# Patient Record
Sex: MALE | Race: BLACK OR AFRICAN AMERICAN | NOT HISPANIC OR LATINO | Employment: OTHER | URBAN - METROPOLITAN AREA
[De-identification: names, ages, dates, MRNs, and addresses within clinical notes are randomized per-mention and may not be internally consistent; named-entity substitution may affect disease eponyms.]

---

## 2017-07-06 ENCOUNTER — HOSPITAL ENCOUNTER (OUTPATIENT)
Dept: RADIOLOGY | Facility: HOSPITAL | Age: 72
Discharge: HOME | End: 2017-07-06
Attending: FAMILY MEDICINE
Payer: MEDICARE

## 2017-07-06 DIAGNOSIS — Z77.098 EXPOSURE TO TOXIC CHEMICAL: Primary | ICD-10-CM

## 2017-07-06 DIAGNOSIS — R63.4 LOSS OF WEIGHT: ICD-10-CM

## 2017-07-06 DIAGNOSIS — R53.83 FATIGUE: Primary | ICD-10-CM

## 2017-07-06 DIAGNOSIS — Z00.00 ROUTINE GENERAL MEDICAL EXAMINATION AT A HEALTH CARE FACILITY: ICD-10-CM

## 2017-07-06 DIAGNOSIS — Z77.098 EXPOSURE TO TOXIC CHEMICAL: ICD-10-CM

## 2017-07-06 PROCEDURE — 71020 XR CHEST PA AND LATERAL: CPT | Mod: 26,,, | Performed by: RADIOLOGY

## 2017-09-20 ENCOUNTER — TELEPHONE (OUTPATIENT)
Dept: RADIOLOGY | Facility: HOSPITAL | Age: 72
End: 2017-09-20

## 2017-09-21 ENCOUNTER — HOSPITAL ENCOUNTER (OUTPATIENT)
Dept: RADIOLOGY | Facility: HOSPITAL | Age: 72
Discharge: HOME | End: 2017-09-21
Attending: PSYCHIATRY & NEUROLOGY
Payer: MEDICARE

## 2017-09-21 DIAGNOSIS — R41.3 MEMORY LOSS: ICD-10-CM

## 2017-09-21 DIAGNOSIS — G20.C PARKINSONISM: ICD-10-CM

## 2017-09-21 DIAGNOSIS — R26.9 GAIT ABNORMALITY: ICD-10-CM

## 2017-09-21 DIAGNOSIS — R25.1 TREMOR: ICD-10-CM

## 2017-09-21 DIAGNOSIS — R25.1 TREMOR: Primary | ICD-10-CM

## 2017-09-21 PROCEDURE — 70551 MRI BRAIN STEM W/O DYE: CPT | Mod: 26,,, | Performed by: RADIOLOGY

## 2017-09-21 PROCEDURE — 70551 MRI BRAIN STEM W/O DYE: CPT | Mod: TC,PO

## 2022-03-16 ENCOUNTER — HOSPITAL ENCOUNTER (OUTPATIENT)
Facility: HOSPITAL | Age: 77
Discharge: HOSPICE | End: 2022-03-18
Attending: EMERGENCY MEDICINE | Admitting: INTERNAL MEDICINE
Payer: MEDICARE

## 2022-03-16 ENCOUNTER — DOCUMENTATION ONLY (OUTPATIENT)
Dept: GASTROENTEROLOGY | Facility: HOSPITAL | Age: 77
End: 2022-03-16
Payer: MEDICARE

## 2022-03-16 DIAGNOSIS — F03.90 DEMENTIA WITHOUT BEHAVIORAL DISTURBANCE, UNSPECIFIED DEMENTIA TYPE: ICD-10-CM

## 2022-03-16 DIAGNOSIS — D64.9 ANEMIA, UNSPECIFIED TYPE: Primary | ICD-10-CM

## 2022-03-16 DIAGNOSIS — A41.9 SEVERE SEPSIS: ICD-10-CM

## 2022-03-16 DIAGNOSIS — R53.1 WEAKNESS: ICD-10-CM

## 2022-03-16 DIAGNOSIS — A41.9 SEPSIS: ICD-10-CM

## 2022-03-16 DIAGNOSIS — R93.3 ABNORMAL CT SCAN, SIGMOID COLON: ICD-10-CM

## 2022-03-16 DIAGNOSIS — K40.90 RIGHT INGUINAL HERNIA: ICD-10-CM

## 2022-03-16 DIAGNOSIS — R65.20 SEVERE SEPSIS: ICD-10-CM

## 2022-03-16 DIAGNOSIS — K56.41 FECAL IMPACTION IN RECTUM: ICD-10-CM

## 2022-03-16 DIAGNOSIS — L89.90 PRESSURE INJURY OF SKIN, UNSPECIFIED INJURY STAGE, UNSPECIFIED LOCATION: ICD-10-CM

## 2022-03-16 DIAGNOSIS — R33.9 URINARY RETENTION: ICD-10-CM

## 2022-03-16 DIAGNOSIS — I10 HTN (HYPERTENSION): ICD-10-CM

## 2022-03-16 DIAGNOSIS — I95.9 HYPOTENSION, UNSPECIFIED HYPOTENSION TYPE: ICD-10-CM

## 2022-03-16 PROBLEM — L89.159 SACRAL DECUBITUS ULCER: Status: ACTIVE | Noted: 2022-03-16

## 2022-03-16 PROBLEM — L89.40: Status: ACTIVE | Noted: 2022-03-16

## 2022-03-16 LAB
ALBUMIN SERPL BCP-MCNC: 1.1 G/DL (ref 3.5–5.2)
ALP SERPL-CCNC: 81 U/L (ref 55–135)
ALT SERPL W/O P-5'-P-CCNC: 9 U/L (ref 10–44)
ANION GAP SERPL CALC-SCNC: 5 MMOL/L (ref 8–16)
APTT BLDCRRT: 30.6 SEC (ref 21–32)
AST SERPL-CCNC: 13 U/L (ref 10–40)
BASOPHILS # BLD AUTO: 0.04 K/UL (ref 0–0.2)
BASOPHILS NFR BLD: 0.4 % (ref 0–1.9)
BILIRUB SERPL-MCNC: 0.4 MG/DL (ref 0.1–1)
BILIRUB UR QL STRIP: NEGATIVE
BUN SERPL-MCNC: 12 MG/DL (ref 8–23)
CALCIUM SERPL-MCNC: 7.2 MG/DL (ref 8.7–10.5)
CHLORIDE SERPL-SCNC: 103 MMOL/L (ref 95–110)
CLARITY UR REFRACT.AUTO: CLEAR
CO2 SERPL-SCNC: 27 MMOL/L (ref 23–29)
COLOR UR AUTO: YELLOW
CREAT SERPL-MCNC: 0.5 MG/DL (ref 0.5–1.4)
CTP QC/QA: YES
DIFFERENTIAL METHOD: ABNORMAL
EOSINOPHIL # BLD AUTO: 0.1 K/UL (ref 0–0.5)
EOSINOPHIL NFR BLD: 0.9 % (ref 0–8)
ERYTHROCYTE [DISTWIDTH] IN BLOOD BY AUTOMATED COUNT: 17.6 % (ref 11.5–14.5)
EST. GFR  (AFRICAN AMERICAN): >60 ML/MIN/1.73 M^2
EST. GFR  (NON AFRICAN AMERICAN): >60 ML/MIN/1.73 M^2
GLUCOSE SERPL-MCNC: 106 MG/DL (ref 70–110)
GLUCOSE UR QL STRIP: NEGATIVE
HCT VFR BLD AUTO: 23.3 % (ref 40–54)
HGB BLD-MCNC: 7.2 G/DL (ref 14–18)
HGB UR QL STRIP: ABNORMAL
IMM GRANULOCYTES # BLD AUTO: 0.09 K/UL (ref 0–0.04)
IMM GRANULOCYTES NFR BLD AUTO: 0.8 % (ref 0–0.5)
INR PPP: 1.2 (ref 0.8–1.2)
KETONES UR QL STRIP: NEGATIVE
LACTATE SERPL-SCNC: 1.1 MMOL/L (ref 0.5–2.2)
LACTATE SERPL-SCNC: 1.8 MMOL/L (ref 0.5–2.2)
LEUKOCYTE ESTERASE UR QL STRIP: NEGATIVE
LYMPHOCYTES # BLD AUTO: 1 K/UL (ref 1–4.8)
LYMPHOCYTES NFR BLD: 9.8 % (ref 18–48)
MAGNESIUM SERPL-MCNC: 1.7 MG/DL (ref 1.6–2.6)
MCH RBC QN AUTO: 26.5 PG (ref 27–31)
MCHC RBC AUTO-ENTMCNC: 30.9 G/DL (ref 32–36)
MCV RBC AUTO: 86 FL (ref 82–98)
MICROSCOPIC COMMENT: ABNORMAL
MONOCYTES # BLD AUTO: 0.6 K/UL (ref 0.3–1)
MONOCYTES NFR BLD: 5.6 % (ref 4–15)
NEUTROPHILS # BLD AUTO: 8.8 K/UL (ref 1.8–7.7)
NEUTROPHILS NFR BLD: 82.5 % (ref 38–73)
NITRITE UR QL STRIP: NEGATIVE
NRBC BLD-RTO: 0 /100 WBC
PH UR STRIP: 8 [PH] (ref 5–8)
PLATELET # BLD AUTO: 222 K/UL (ref 150–450)
PMV BLD AUTO: 9.4 FL (ref 9.2–12.9)
POTASSIUM SERPL-SCNC: 4 MMOL/L (ref 3.5–5.1)
PROCALCITONIN SERPL IA-MCNC: 0.07 NG/ML
PROT SERPL-MCNC: 5.3 G/DL (ref 6–8.4)
PROT UR QL STRIP: NEGATIVE
PROTHROMBIN TIME: 12.4 SEC (ref 9–12.5)
RBC # BLD AUTO: 2.72 M/UL (ref 4.6–6.2)
RBC #/AREA URNS AUTO: 7 /HPF (ref 0–4)
SARS-COV-2 RDRP RESP QL NAA+PROBE: NEGATIVE
SODIUM SERPL-SCNC: 135 MMOL/L (ref 136–145)
SP GR UR STRIP: 1.01 (ref 1–1.03)
TROPONIN I SERPL DL<=0.01 NG/ML-MCNC: 0.02 NG/ML (ref 0–0.03)
URN SPEC COLLECT METH UR: ABNORMAL
UROBILINOGEN UR STRIP-ACNC: NEGATIVE EU/DL
WBC # BLD AUTO: 10.66 K/UL (ref 3.9–12.7)
WBC #/AREA URNS AUTO: 0 /HPF (ref 0–5)

## 2022-03-16 PROCEDURE — 25000003 PHARM REV CODE 250: Mod: ER | Performed by: INTERNAL MEDICINE

## 2022-03-16 PROCEDURE — 85025 COMPLETE CBC W/AUTO DIFF WBC: CPT | Mod: ER | Performed by: EMERGENCY MEDICINE

## 2022-03-16 PROCEDURE — U0002 COVID-19 LAB TEST NON-CDC: HCPCS | Mod: ER | Performed by: EMERGENCY MEDICINE

## 2022-03-16 PROCEDURE — 85730 THROMBOPLASTIN TIME PARTIAL: CPT | Mod: ER | Performed by: EMERGENCY MEDICINE

## 2022-03-16 PROCEDURE — 80053 COMPREHEN METABOLIC PANEL: CPT | Mod: ER | Performed by: EMERGENCY MEDICINE

## 2022-03-16 PROCEDURE — G0378 HOSPITAL OBSERVATION PER HR: HCPCS | Mod: ER

## 2022-03-16 PROCEDURE — 96365 THER/PROPH/DIAG IV INF INIT: CPT | Mod: ER

## 2022-03-16 PROCEDURE — 96366 THER/PROPH/DIAG IV INF ADDON: CPT | Mod: ER

## 2022-03-16 PROCEDURE — 84145 PROCALCITONIN (PCT): CPT | Mod: ER | Performed by: EMERGENCY MEDICINE

## 2022-03-16 PROCEDURE — 25500020 PHARM REV CODE 255: Mod: ER | Performed by: EMERGENCY MEDICINE

## 2022-03-16 PROCEDURE — 83605 ASSAY OF LACTIC ACID: CPT | Mod: 91,ER | Performed by: EMERGENCY MEDICINE

## 2022-03-16 PROCEDURE — 63600175 PHARM REV CODE 636 W HCPCS: Mod: ER | Performed by: EMERGENCY MEDICINE

## 2022-03-16 PROCEDURE — 87040 BLOOD CULTURE FOR BACTERIA: CPT | Mod: 59 | Performed by: EMERGENCY MEDICINE

## 2022-03-16 PROCEDURE — 81000 URINALYSIS NONAUTO W/SCOPE: CPT | Mod: ER | Performed by: EMERGENCY MEDICINE

## 2022-03-16 PROCEDURE — 51702 INSERT TEMP BLADDER CATH: CPT | Mod: ER

## 2022-03-16 PROCEDURE — 36556 INSERT NON-TUNNEL CV CATH: CPT | Mod: 52,ER

## 2022-03-16 PROCEDURE — 99291 CRITICAL CARE FIRST HOUR: CPT | Mod: 25,ER

## 2022-03-16 PROCEDURE — 93010 EKG 12-LEAD: ICD-10-PCS | Mod: ,,, | Performed by: INTERNAL MEDICINE

## 2022-03-16 PROCEDURE — 93010 ELECTROCARDIOGRAM REPORT: CPT | Mod: ,,, | Performed by: INTERNAL MEDICINE

## 2022-03-16 PROCEDURE — 25000003 PHARM REV CODE 250: Mod: ER | Performed by: EMERGENCY MEDICINE

## 2022-03-16 PROCEDURE — 85610 PROTHROMBIN TIME: CPT | Mod: ER | Performed by: EMERGENCY MEDICINE

## 2022-03-16 PROCEDURE — 83735 ASSAY OF MAGNESIUM: CPT | Mod: ER | Performed by: EMERGENCY MEDICINE

## 2022-03-16 PROCEDURE — 93005 ELECTROCARDIOGRAM TRACING: CPT | Mod: ER

## 2022-03-16 PROCEDURE — 96361 HYDRATE IV INFUSION ADD-ON: CPT | Mod: ER,59

## 2022-03-16 PROCEDURE — 84484 ASSAY OF TROPONIN QUANT: CPT | Mod: ER | Performed by: EMERGENCY MEDICINE

## 2022-03-16 RX ORDER — SODIUM CHLORIDE 9 MG/ML
INJECTION, SOLUTION INTRAVENOUS CONTINUOUS
Status: ACTIVE | OUTPATIENT
Start: 2022-03-16 | End: 2022-03-17

## 2022-03-16 RX ORDER — MAG HYDROX/ALUMINUM HYD/SIMETH 200-200-20
30 SUSPENSION, ORAL (FINAL DOSE FORM) ORAL EVERY 6 HOURS PRN
Status: DISCONTINUED | OUTPATIENT
Start: 2022-03-16 | End: 2022-03-18 | Stop reason: HOSPADM

## 2022-03-16 RX ORDER — FAMOTIDINE 10 MG/ML
20 INJECTION INTRAVENOUS EVERY 12 HOURS
Status: CANCELLED | OUTPATIENT
Start: 2022-03-16

## 2022-03-16 RX ORDER — ONDANSETRON 2 MG/ML
4 INJECTION INTRAMUSCULAR; INTRAVENOUS EVERY 8 HOURS PRN
Status: DISCONTINUED | OUTPATIENT
Start: 2022-03-16 | End: 2022-03-18 | Stop reason: HOSPADM

## 2022-03-16 RX ORDER — CEFEPIME HYDROCHLORIDE 1 G/50ML
2 INJECTION, SOLUTION INTRAVENOUS
Status: COMPLETED | OUTPATIENT
Start: 2022-03-16 | End: 2022-03-16

## 2022-03-16 RX ORDER — SODIUM CHLORIDE 0.9 % (FLUSH) 0.9 %
10 SYRINGE (ML) INJECTION
Status: CANCELLED | OUTPATIENT
Start: 2022-03-16

## 2022-03-16 RX ORDER — CEFEPIME HYDROCHLORIDE 1 G/50ML
1 INJECTION, SOLUTION INTRAVENOUS
Status: DISCONTINUED | OUTPATIENT
Start: 2022-03-17 | End: 2022-03-18 | Stop reason: HOSPADM

## 2022-03-16 RX ORDER — TALC
6 POWDER (GRAM) TOPICAL NIGHTLY PRN
Status: CANCELLED | OUTPATIENT
Start: 2022-03-16

## 2022-03-16 RX ORDER — SULFAMETHOXAZOLE AND TRIMETHOPRIM 800; 160 MG/1; MG/1
1 TABLET ORAL 2 TIMES DAILY
COMMUNITY

## 2022-03-16 RX ORDER — MEMANTINE HYDROCHLORIDE 10 MG/1
5 TABLET ORAL 2 TIMES DAILY
COMMUNITY

## 2022-03-16 RX ORDER — IPRATROPIUM BROMIDE AND ALBUTEROL SULFATE 2.5; .5 MG/3ML; MG/3ML
3 SOLUTION RESPIRATORY (INHALATION) EVERY 4 HOURS PRN
Status: DISCONTINUED | OUTPATIENT
Start: 2022-03-16 | End: 2022-03-17

## 2022-03-16 RX ORDER — POTASSIUM CHLORIDE 3 G/15ML
20 SOLUTION ORAL DAILY
COMMUNITY

## 2022-03-16 RX ORDER — GUAIFENESIN 100 MG/5ML
200 SOLUTION ORAL EVERY 4 HOURS PRN
Status: DISCONTINUED | OUTPATIENT
Start: 2022-03-16 | End: 2022-03-18 | Stop reason: HOSPADM

## 2022-03-16 RX ORDER — ACETAMINOPHEN 325 MG/1
650 TABLET ORAL EVERY 6 HOURS PRN
Status: DISCONTINUED | OUTPATIENT
Start: 2022-03-16 | End: 2022-03-17

## 2022-03-16 RX ORDER — SODIUM CHLORIDE 9 MG/ML
1000 INJECTION, SOLUTION INTRAVENOUS CONTINUOUS
Status: CANCELLED | OUTPATIENT
Start: 2022-03-16

## 2022-03-16 RX ORDER — VANCOMYCIN HCL IN 5 % DEXTROSE 1G/250ML
20 PLASTIC BAG, INJECTION (ML) INTRAVENOUS ONCE
Status: COMPLETED | OUTPATIENT
Start: 2022-03-16 | End: 2022-03-16

## 2022-03-16 RX ORDER — METRONIDAZOLE 500 MG/1
500 TABLET ORAL
Status: COMPLETED | OUTPATIENT
Start: 2022-03-16 | End: 2022-03-16

## 2022-03-16 RX ADMIN — SODIUM CHLORIDE: 0.9 INJECTION, SOLUTION INTRAVENOUS at 08:03

## 2022-03-16 RX ADMIN — SODIUM CHLORIDE, SODIUM LACTATE, POTASSIUM CHLORIDE, AND CALCIUM CHLORIDE 1320 ML: .6; .31; .03; .02 INJECTION, SOLUTION INTRAVENOUS at 03:03

## 2022-03-16 RX ADMIN — METRONIDAZOLE 500 MG: 500 TABLET ORAL at 04:03

## 2022-03-16 RX ADMIN — VANCOMYCIN HYDROCHLORIDE 1000 MG: 1 INJECTION, POWDER, LYOPHILIZED, FOR SOLUTION INTRAVENOUS at 05:03

## 2022-03-16 RX ADMIN — IOHEXOL 75 ML: 350 INJECTION, SOLUTION INTRAVENOUS at 04:03

## 2022-03-16 RX ADMIN — CEFEPIME HYDROCHLORIDE 2 G: 2 INJECTION, SOLUTION INTRAVENOUS at 04:03

## 2022-03-16 NOTE — Clinical Note
Diagnosis: Anemia, unspecified type [[id number]]   Future Attending Provider: TOMA ANGEL [[id number]]   Is the patient being admitted to ED TeleObservation?: No   Admitting Provider:: TOMA ANGEL [[id number]]   Bed request comments: tele

## 2022-03-16 NOTE — ED PROVIDER NOTES
History     Chief Complaint   Patient presents with    Hypotension     Pt has hx dementia, parkinsons, bed bound. [facility] after being treated for complications with sacral wound. Pt has not been eating or drinking much. BP low. Pt transported to ER by [facility]. Initial BP was 76/51. [facility] administered about 250ml NS and BP up to 95/61. Pt normally does not speak much per family. Unable to answer most questions. Followed command to open mouth for temp.        Review of patient's allergies indicates:   Allergen Reactions    Relafen [nabumetone] Hives and Rash       History of Present Illness   HPI    3/16/2022, 2:50 PM  The history is provided by the patient (Dementia), Daughter and spouse.    Abdoulaye Nguyen Sr. is a 76 y.o. male presenting to the ED for weakness.  [facility] for an infected [facility].  Patient has not been eating or drinking much.  Blood pressure low.  Presentation blood pressure was 76/51.  Patient has history of dementia.  History is mostly obtained per daughter and spouse.      Arrival mode:  [facility]    PCP: Cyndi Veronica MD     Allergies:  Review of patient's allergies indicates:   Allergen Reactions    Relafen [nabumetone] Hives and Rash       Past Medical History:  Past Medical History:   Diagnosis Date    Asthma     Dementia due to Parkinson's disease without behavioral disturbance     Encounter for blood transfusion        Past Surgical History:  History reviewed. No pertinent surgical history.      Family History:  History reviewed. No pertinent family history.    Social History:  Social History     Tobacco Use    Smoking status: Never Smoker    Smokeless tobacco: Never Used   Substance and Sexual Activity    Alcohol use: No    Drug use: No    Sexual activity: Not Currently     Partners: Female        Review of Systems   Review of Systems   Unable to perform ROS: Dementia           Physical Exam     Initial Vitals [03/16/22 1448]   BP Pulse Resp Temp SpO2   (!) 83/53 98 (!) 24 98.6 °F (37 °C) 100 %      MAP       --          Physical Exam    Nursing Notes and Vital Signs Reviewed.  Constitutional: Patient is in mild distress. Thin appearing.   Head: Atraumatic. Normocephalic.  Eyes: PERRL. EOM intact. Conjunctivae are  pale. No scleral icterus.  ENT: Mucous membranes are moist. Oropharynx is clear and symmetri.    Neck: Supple. Full ROM. No lymphadenopathy.  Cardiovascular: Regular rate. Regular rhythm. No murmurs, rubs, or gallops. Distal pulses are 2+ and symmetric.  Pulmonary/Chest: No respiratory distress. Clear to auscultation bilaterally. No wheezing or rales.  Abdominal: Soft ,distended, and tympanatic.  There is no tenderness.  No rebound, guarding, or rigidity. Good bowel sounds.  Genitourinary: No CVA tenderness.  Right inguinal hernia  Musculoskeletal:  Kyphosis present.  Large eschar  over left shoulder.  Contracture noted to the left groin.  Skin: Warm and dry.  Neurological:  Alert, awake, and appropriate.  Nonverbal  No acute focal neurological deficits are appreciated.  Psychiatric: Unable to assess.    19:30  digital rectal exam.  There was significant release flatulence with rectal exam.  No stool palpated.    Left Shoulder:        Sacral, left up superior      Right Hip:      Sacral Decubitus         ED Course     ED Procedures:  Central Line    Date/Time: 3/16/2022 6:05 PM  Performed by: Rochelle Yates DO  Authorized by: Rochelel Yates DO     Location procedure was performed:  [facility] [place] EMERGENCY DEPARTMENT  Consent Done ?:  Yes  Time out complete?: Verified correct patient, procedure, equipment, staff, and site/side    Indications:  Hemodynamic monitoring and vascular access  Anesthesia:  Local infiltration  Local anesthetic:  Lidocaine 1% without epinephrine  Anesthetic total (ml):  1  Preparation:  Skin prepped with chlorhexidine (without alcohol)  Skin prep agent  dried: Skin prep agent completely dried prior to procedure    Sterile barriers: All five maximal sterile barriers used - gloves, gown, cap, mask and large sterile sheet    Hand hygiene: Hand hygiene performed immediately prior to central venous catheter insertion    Location:  Right femoral  Site selection rationale:  Only area without contractions.  Family declinced subclavian. (Patient with kyphosis.  Unable to lay flat.  Family declined chest central line subclavian.  Left hip unable to extend secondary to chronic dislocation.)  Catheter type:  Triple lumen  Catheter size:  7.5 Fr  Ultrasound guidance: Yes    Vessel Caliber:  Medium   not patent  Comprressibility:  Poor  Needle advanced into vessel with real time ultrasound guidance.    Steril sheath on probe.    Sterile gel used.  Manometry: No    Number of attempts:  2  Adverse Events:  None  Other Complications:  Unable to place central line.  Patient re-scanned after central line attempt.  Case discussed with radiologist.  No perforations noted   Unable to place central line.  Patient re-scanned after central line attempt.  Case discussed with radiologist.  No perforations noted  Critical Care    Date/Time: 3/16/2022 2:50 PM  Performed by: Rochelle Yates DO  Authorized by: Rochelle Yates DO   Direct patient critical care time: 25 minutes  Additional history critical care time: 2 minutes  Ordering / reviewing critical care time: 10 minutes  Documentation critical care time: 15 minutes  Consulting other physicians critical care time: 10 minutes  Total critical care time (exclusive of procedural time) : 62 minutes  Critical care time was exclusive of separately billable procedures and treating other patients.  Critical care was necessary to treat or prevent imminent or life-threatening deterioration of the following conditions: sepsis.  Critical care was time spent personally by me on the following activities: blood draw for specimens, evaluation of  patient's response to treatment, obtaining history from patient or surrogate, ordering and review of laboratory studies, pulse oximetry, review of old charts, development of treatment plan with patient or surrogate, examination of patient, ordering and performing treatments and interventions, ordering and review of radiographic studies, re-evaluation of patient's condition, vascular access procedures and discussions with consultants.          ED Vital Signs:  Vitals:    03/16/22 1448 03/16/22 1452 03/16/22 1458 03/16/22 1548   BP: (!) 83/53   (!) 86/55   Pulse: 98  93 90   Resp: (!) 24   (!) 24   Temp: 98.6 °F (37 °C)      TempSrc: Oral      SpO2: 100%   100%   Weight:  44 kg (97 lb)      03/16/22 1602 03/16/22 1702 03/16/22 1732 03/16/22 1801   BP: 92/60 (!) 91/58 (!) 81/55    Pulse: 72 83 89 80   Resp: 17 13 13 15   Temp:       TempSrc:       SpO2: 100% 99% 100% 100%   Weight:        03/16/22 1802 03/16/22 1831 03/16/22 1932   BP: (!) 81/58 105/71 108/67   Pulse:  81 81   Resp:  11 12   Temp:      TempSrc:      SpO2:  100%    Weight:          Abnormal Lab Results:  Labs Reviewed   CBC W/ AUTO DIFFERENTIAL - Abnormal; Notable for the following components:       Result Value    RBC 2.72 (*)     Hemoglobin 7.2 (*)     Hematocrit 23.3 (*)     MCH 26.5 (*)     MCHC 30.9 (*)     RDW 17.6 (*)     Immature Granulocytes 0.8 (*)     Gran # (ANC) 8.8 (*)     Immature Grans (Abs) 0.09 (*)     Gran % 82.5 (*)     Lymph % 9.8 (*)     All other components within normal limits   COMPREHENSIVE METABOLIC PANEL - Abnormal; Notable for the following components:    Sodium 135 (*)     Calcium 7.2 (*)     Total Protein 5.3 (*)     Albumin 1.1 (*)     ALT 9 (*)     Anion Gap 5 (*)     All other components within normal limits   CULTURE, BLOOD   CULTURE, BLOOD   LACTIC ACID, PLASMA   TROPONIN I   PROCALCITONIN   PROTIME-INR   APTT   MAGNESIUM   LACTIC ACID, PLASMA   URINALYSIS, REFLEX TO URINE CULTURE   SARS-COV-2 RDRP GENE        All Lab  Results:  Results for orders placed or performed during the hospital encounter of 03/16/22   CBC auto differential   Result Value Ref Range    WBC 10.66 3.90 - 12.70 K/uL    RBC 2.72 (L) 4.60 - 6.20 M/uL    Hemoglobin 7.2 (L) 14.0 - 18.0 g/dL    Hematocrit 23.3 (L) 40.0 - 54.0 %    MCV 86 82 - 98 fL    MCH 26.5 (L) 27.0 - 31.0 pg    MCHC 30.9 (L) 32.0 - 36.0 g/dL    RDW 17.6 (H) 11.5 - 14.5 %    Platelets 222 150 - 450 K/uL    MPV 9.4 9.2 - 12.9 fL    Immature Granulocytes 0.8 (H) 0.0 - 0.5 %    Gran # (ANC) 8.8 (H) 1.8 - 7.7 K/uL    Immature Grans (Abs) 0.09 (H) 0.00 - 0.04 K/uL    Lymph # 1.0 1.0 - 4.8 K/uL    Mono # 0.6 0.3 - 1.0 K/uL    Eos # 0.1 0.0 - 0.5 K/uL    Baso # 0.04 0.00 - 0.20 K/uL    nRBC 0 0 /100 WBC    Gran % 82.5 (H) 38.0 - 73.0 %    Lymph % 9.8 (L) 18.0 - 48.0 %    Mono % 5.6 4.0 - 15.0 %    Eosinophil % 0.9 0.0 - 8.0 %    Basophil % 0.4 0.0 - 1.9 %    Differential Method Automated    Comprehensive metabolic panel   Result Value Ref Range    Sodium 135 (L) 136 - 145 mmol/L    Potassium 4.0 3.5 - 5.1 mmol/L    Chloride 103 95 - 110 mmol/L    CO2 27 23 - 29 mmol/L    Glucose 106 70 - 110 mg/dL    BUN 12 8 - 23 mg/dL    Creatinine 0.5 0.5 - 1.4 mg/dL    Calcium 7.2 (L) 8.7 - 10.5 mg/dL    Total Protein 5.3 (L) 6.0 - 8.4 g/dL    Albumin 1.1 (L) 3.5 - 5.2 g/dL    Total Bilirubin 0.4 0.1 - 1.0 mg/dL    Alkaline Phosphatase 81 55 - 135 U/L    AST 13 10 - 40 U/L    ALT 9 (L) 10 - 44 U/L    Anion Gap 5 (L) 8 - 16 mmol/L    eGFR if African American >60.0 >60 mL/min/1.73 m^2    eGFR if non African American >60.0 >60 mL/min/1.73 m^2   Lactic acid, plasma #1   Result Value Ref Range    Lactate (Lactic Acid) 1.1 0.5 - 2.2 mmol/L   Troponin I   Result Value Ref Range    Troponin I 0.017 0.000 - 0.026 ng/mL   Procalcitonin   Result Value Ref Range    Procalcitonin 0.07 <0.25 ng/mL   Protime-INR   Result Value Ref Range    Prothrombin Time 12.4 9.0 - 12.5 sec    INR 1.2 0.8 - 1.2   APTT   Result Value Ref Range     aPTT 30.6 21.0 - 32.0 sec   Magnesium   Result Value Ref Range    Magnesium 1.7 1.6 - 2.6 mg/dL   Lactic acid, plasma #2   Result Value Ref Range    Lactate (Lactic Acid) 1.8 0.5 - 2.2 mmol/L           The EKG was ordered, reviewed, and independently interpreted by the ED provider.  EKG:  Rate of 94 beats per minute.  Normal axis.  Low QRS amplitude.  No ST segment elevation.  No STEMI.  ECG Results          EKG 12-lead (Final result)  Result time 03/16/22 17:51:39    Final result by Interface, Lab In [facility] (03/16/22 17:51:39)                 Narrative:    Test Reason : R53.1,    Vent. Rate : 094 BPM     Atrial Rate : 094 BPM     P-R Int : 136 ms          QRS Dur : 068 ms      QT Int : 332 ms       P-R-T Axes : 060 009 036 degrees     QTc Int : 415 ms    Normal sinus rhythm  Low voltage QRS  Nonspecific T wave abnormality  Abnormal ECG  No previous ECGs available  Confirmed by LUIS AGUIRRE MD ([id number]) on 3/16/2022 5:51:30 PM    Referred By: [email or web address]   SELF           Confirmed By:LUIS AGUIRRE MD                              Imaging Results:  Imaging Results          CT Abdomen Pelvis  Without Contrast (Final result)  Result time 03/16/22 19:21:05    Final result by Branden Estrada MD (03/16/22 19:21:05)                 Impression:      As above    All CT scans   are performed using dose optimization techniques including the following: automated exposure control; adjustment of the mA and/or kV; use of iterative reconstruction technique.  Dose modulation was employed for ALARA by means of: Automated exposure control; adjustment of the mA and/or kV according to patient size (this includes techniques or standardized protocols for targeted exams where dose is matched to indication/reason for exam; i.e. extremities or head); and/or use of iterative reconstructive technique.      Electronically signed by: Sean Otero  Date:    03/16/2022  Time:    19:21             Narrative:    EXAMINATION:  CT ABDOMEN PELVIS WITHOUT  CONTRAST    CLINICAL HISTORY:  attempt central line right groin;    TECHNIQUE:  Low dose axial images, sagittal and coronal reformations were obtained from the lung bases to the pubic symphysis, 30 mL of oral Omnipaque 350 was administered..    COMPARISON:  Recent prior    FINDINGS:  In the right inguinal region there appears to be herniated small bowel loops in the inguinal hernia.  Compare series 2, image 157 of recent exam 2 series 2, image 145 of the current exam.  No foci of gas in the adjacent mesentery or soft tissues to suggest bowel perforation.  Remaining findings similar to prior exam.  Discussed with Dr. Yates.  Attention on follow-up.                               CT Abdomen Pelvis With Contrast (Final result)  Result time 03/16/22 17:13:52    Final result by Branden Estrada MD (03/16/22 17:13:52)                 Impression:      Severe gaseous distension of the sigmoid colon up to 14.8 cm may relate to distal obstruction.  Recommend clinical correlation and follow-up    Chronic dislocation and inflammatory change involving the left hip with large associated fluid collection may reflect inflamed synovium.  Underlying abscess not excluded.  Dystrophic calcification involving the left hip.    Moderate amount of stool in the colon    Decubitus ulcer in the presacral region    Mild basilar pleural effusions.    Moderate anasarca    Bilateral pelviectasis or mild hydronephrosis with a 8 mm left renal pelvic stone. Distended bladder with thickened walls.    Recommend clinical correlation and follow-up    All CT scans at this facility use dose modulation, iterative reconstruction, and/or weight based dosing when appropriate to reduce radiation dose to as low as reasonably achievable.      Electronically signed by: Sean Otero  Date:    03/16/2022  Time:    17:13             Narrative:    EXAMINATION:  CT ABDOMEN PELVIS WITH CONTRAST    CLINICAL HISTORY:  low air collected in the  abdomen;    TECHNIQUE:  Low dose axial images, sagittal and coronal reformations were obtained from the lung bases to the pubic symphysis following the IV administration of 100 mL of Omnipaque 350.    COMPARISON:  None    FINDINGS:  Mild subcutaneous edema.  A suggestion of a decubitus ulcer in the a pre sacral region.  Chronic dislocation and inflammatory process involving the left hip joint.  Dystrophic calcification and fluid collection is also identified involving the left hip.  Bladder wall is thickened and distended.  Severe distension of the sigmoid colon measures up to 14.8 cm on the diameter on coronal view.  Distended colon with gas and stool.  Mild basilar pleural effusions.  Basilar atelectasis less likely early consolidation.  Are grossly unremarkable.  Moderate amount of stool in the colon.  Atherosclerotic changes noted.  Moderate anasarca.  Right-sided renal cyst.  Bilateral pelviectasis or mild hydronephrosis.  Left renal pelvic stone measures up to 8 mm.                               X-Ray Chest AP Portable (Final result)  Result time 03/16/22 15:31:37    Final result by Vineet Palacios MD (03/16/22 15:31:37)                 Impression:      1.  Low lung volumes with vascular crowding present.  Lungs are otherwise clear.    2.  Large air collection underneath the hemidiaphragm most likely dilated stomach or loop of colon.  Clinical correlation is advised    3.  Stable findings as noted above.      Electronically signed by: Vineet Palacios MD  Date:    03/16/2022  Time:    15:31             Narrative:    EXAMINATION:  XR CHEST AP PORTABLE    CLINICAL HISTORY:  Sepsis;    COMPARISON:  July 6, 2017    FINDINGS:  Low lung volumes with mild vascular crowding present.  The lungs are clear. The cardiac silhouette size is normal. The trachea is midline and the mediastinal width is normal. Negative for focal infiltrate, effusion or pneumothorax. Pulmonary vasculature is normal. Negative for osseous  abnormalities. Tortuous aorta.  Multiple calcified hilar and mediastinal lymph nodes.    Large air collection underneath the hemidiaphragm, either markedly distended colon or stomach.                                      The Emergency Provider reviewed the vital signs and test results, which are outlined above.     ED Discussion     ED Course as of 03/16/22 2028   Wed Mar 16, 2022   1523 Spoke with spouse and daughter.  Patient recently discharged from hospital in [place].  Patient had been evaluated for hospice [LB]   1529 Discussed sepsis bundle.  Discussed risk benefits of central line and antibiotics.  At this time, patient and family member are deciding on aggressiveness of care.  They would like to see how her response to fluids. [LB]   1600 Lactate, Ajit: 1.1 [LB]   1600 Hemoglobin(!): 7.2 [LB]   1600 Hematocrit(!): 23.3 [LB]   1606 Blood pressure is 92/60 [LB]   1730 Fluid challenge completed.  Vital signs have been reviewed.  A focused perfusion assessment (septic focused exam) was completed.      [LB]   1735 Spoke with spouse and daughter about central line and standard of care. [LB]   1739 Spoke with family about tests, recommendations of central line.  Unable to place in neck.  Secondary to patient's kyphosis.  Family declined subclavian only acces point would be right groin.  Risk benefits discussed   [LB]   1832 Discussed with family failed central line attempt.  At this time, family declines further attempts at central line.  Perhaps consideration PICC line.  Although spouse states PICC line not recommended when he was at the [facility]. [LB]   1843 Spoke with Magaly Jacobson NP:   Recommends Dr. Shayan constantino [LB]   1917 Spoke with Radiologist:  Distented loops on bowels in the right groin.   No free air. [LB]   1925 Discussed with the daughter that repeat CT scan does not show perforation after central line attempt.  There is colonic distension which is present.  Digital rectal exam was able to  release some gas.  Again there is reaccumulation of a right inguinal hernia. [LB]   1933 Spoke with Dr. Nava:   She reviewed CT scan.  Does not feel that right inguinal hernia is responsible for SBO.  She recommends speaking with GI. [LB]   1941 Case discussed with Gastroenterology (Dr. Friedman) for recommendations.  Recommendations for a rectal tube.   Harry does not have a rectal tube at this facility.  This recommendation was shared with  [LB]   1948 Spoke with Dr. Downey regarding recommendations. [LB]   1957 Daughter confirms DNR status.  DNR signed. [LB]   2016 Second daughter confirms DNR status as well.   Discussed unable to place central line.  Patient's blood pressure 108/87.  Patient appears comfortable.  All questions answered [LB]   2028 Oncoming physician, Dr. CHRIS Hector aware of plan of care. [LB]      ED Course User Index  [LB] Rochelle Yates,      6:49 PM  Patient/Family requests transfer to: [facility].    All historical, clinical, radiographic, and laboratory findings were reviewed with the patient/family in detail.  I discussed the indications and treatment need (fluids, antibiotics, possible blood) for transfer to our facility in [place].  Patient/family verbalized understanding.   All remaining questions and concerns were addressed at that time and the patient/family agrees to proceed accordingly.  Similarly all pertinent details of the encounter were discussed with NARESH Rice NP at [facility]. who agrees to accept the patient in transfer based on the needs/patient preferences outlined above.  Patient [facility] secondary to a need for ongoing fluids, antibiotics, fluids and cardiac en route.  Risks: of transfer:    loss of vitals signs, permanent neurologic damage, MVC, resulting in death, or loss of neurologic function.  Benefits of transfer: internal medicine, fluids.  Patient and family agree and verbalize understanding.     Rochelle MONTANA  DO Trudy,  KINDRA      .MIPS Measure #76:  Prevention of Central Venous Catheter-related blood stream infections.    Maximal sterile barrier technique followed:  Hand hygiene, skin preparation, cap, mask, sterile gloves, sterile gown, and full body drape: Yes    Ultrasound was used:  Yes  Sterile ultrasound technique was followed: Yes      ED Medication(s):  Medications   vancomycin - pharmacy to dose (has no administration in time range)   0.9%  NaCl infusion (has no administration in time range)   acetaminophen tablet 650 mg (has no administration in time range)   ondansetron injection 4 mg (has no administration in time range)   guaiFENesin 100 mg/5 ml syrup 200 mg (has no administration in time range)   aluminum-magnesium hydroxide-simethicone 200-200-20 mg/5 mL suspension 30 mL (has no administration in time range)   albuterol-ipratropium 2.5 mg-0.5 mg/3 mL nebulizer solution 3 mL (has no administration in time range)   cefepime in dextrose 5 % 1 gram/50 mL IVPB 1 g (has no administration in time range)   lactated ringers bolus 1,320 mL (0 mL/kg × 44 kg Intravenous Stopped 3/16/22 1700)   vancomycin in dextrose 5 % 1 gram/250 mL IVPB 1,000 mg (0 mg/kg × 44 kg Intravenous Stopped 3/16/22 1931)   cefepime in dextrose 5 % IVPB 2 g (0 g Intravenous Stopped 3/16/22 1711)   metroNIDAZOLE tablet 500 mg (500 mg Oral Given 3/16/22 1637)   iohexoL (OMNIPAQUE 350) injection 75 mL (75 mLs Intravenous Given 3/16/22 1643)             MIPS Measures     Smoker? No     Hypertension: None         Medical Decision Making     Medical Decision Making:   ED Management:  Patient recently discharged from [facility] with presumed sepsis, symptomatic anemia requiring blood transfusion, and infected sacral decubitus.  Patient home for 3 days.  Noted to have decreased p.o. intake and decreased interaction with family.  Patient noted be hypotensive at home.  Patient given 250 cc fluid bolus prior to arrival.  Patient underwent sepsis  "bundle.  Patient noted to have distention of his colon.  Patient was given 30 mL/kilos fluid bolus as well as IV antibiotic.  Patient noted to have distended sigmoid colon as well as distended bladder.  Simon catheter placed.  As patient failed to respond to 30 mL/kilos fluid bolus, an attempt for central line was placed after discussion with family with risk benefits.  Two attempts were made.  Only axis point was right femoral area.  Unfortunately, this vessel was poorly compressible.  Unable to secure central line.  This was discussed with family in detail.  At this time, they did elect patient to be made DNR.  Patient was rescanned given the close proximity of the an inguinal hernia and access point for central line.  Case was discussed with radiologist.  No perforations noted.  Case was discussed with [facility] observation tele.  Case was discussed with Gastroenterology who agreed with rectal tube.  Case was also discussed with General surgery secondary to a colonic distension.  Patient did undergo a rectal exam which resulted in release of flatulence.  Note: [place] location does not have rectal tube.             MDM  Reviewed: nursing note and vitals  Interpretation: labs, ECG and CT scan  Consults: general surgery, gastrointestinal and admitting MD (Radiology)          Portions of this note may have been created with voice recognition software. Occasional "wrong-word" or "sound-a-like" substitutions may have occurred due to the inherent limitations of voice recognition software. Please, read the note carefully and recognize, using context, where substitutions have occurred.            Clinical Impression       ICD-10-CM ICD-9-CM   1. Anemia, unspecified type  D64.9 285.9   2. Weakness  R53.1 780.79   3. Urinary retention  R33.9 788.20   4. Fecal impaction in rectum  K56.41 560.32   5. Dementia without behavioral disturbance, unspecified dementia type  F03.90 294.20   6. Pressure injury of " skin, unspecified injury stage, unspecified location  L89.90 707.00     707.20   7. Abnormal CT scan, sigmoid colon  R93.3 793.4   8. Hypotension, unspecified hypotension type  I95.9 458.9   9. Right inguinal hernia  K40.90 550.90         ED Disposition       Disposition: Admit to telemetry, Dr. Downey  Patient condition: Serious             Rochelle Yates, DO  03/16/22 2018       Rochelle Yates, DO  03/16/22 2028

## 2022-03-17 PROBLEM — D64.9 ANEMIA: Status: ACTIVE | Noted: 2022-03-17

## 2022-03-17 PROBLEM — A41.9 SEVERE SEPSIS: Status: ACTIVE | Noted: 2022-03-17

## 2022-03-17 PROBLEM — R65.20 SEVERE SEPSIS: Status: ACTIVE | Noted: 2022-03-17

## 2022-03-17 PROBLEM — K63.89 COLON DISTENTION: Status: ACTIVE | Noted: 2022-03-17

## 2022-03-17 PROBLEM — R53.2 FUNCTIONAL QUADRIPLEGIA: Status: ACTIVE | Noted: 2022-03-17

## 2022-03-17 LAB
ABO + RH BLD: NORMAL
ALBUMIN SERPL BCP-MCNC: 1.1 G/DL (ref 3.5–5.2)
ALP SERPL-CCNC: 82 U/L (ref 55–135)
ALT SERPL W/O P-5'-P-CCNC: 6 U/L (ref 10–44)
ANION GAP SERPL CALC-SCNC: 5 MMOL/L (ref 8–16)
ANISOCYTOSIS BLD QL SMEAR: SLIGHT
AST SERPL-CCNC: 19 U/L (ref 10–40)
BASOPHILS # BLD AUTO: 0.04 K/UL (ref 0–0.2)
BASOPHILS NFR BLD: 0.4 % (ref 0–1.9)
BILIRUB SERPL-MCNC: 0.3 MG/DL (ref 0.1–1)
BLD GP AB SCN CELLS X3 SERPL QL: NORMAL
BUN SERPL-MCNC: 9 MG/DL (ref 8–23)
CALCIUM SERPL-MCNC: 7.7 MG/DL (ref 8.7–10.5)
CHLORIDE SERPL-SCNC: 105 MMOL/L (ref 95–110)
CO2 SERPL-SCNC: 25 MMOL/L (ref 23–29)
CREAT SERPL-MCNC: 0.5 MG/DL (ref 0.5–1.4)
DIFFERENTIAL METHOD: ABNORMAL
EOSINOPHIL # BLD AUTO: 0.2 K/UL (ref 0–0.5)
EOSINOPHIL NFR BLD: 2.1 % (ref 0–8)
ERYTHROCYTE [DISTWIDTH] IN BLOOD BY AUTOMATED COUNT: 17.5 % (ref 11.5–14.5)
EST. GFR  (AFRICAN AMERICAN): >60 ML/MIN/1.73 M^2
EST. GFR  (NON AFRICAN AMERICAN): >60 ML/MIN/1.73 M^2
FERRITIN SERPL-MCNC: 1094 NG/ML (ref 20–300)
GLUCOSE SERPL-MCNC: 72 MG/DL (ref 70–110)
HCT VFR BLD AUTO: 25.4 % (ref 40–54)
HGB BLD-MCNC: 7.7 G/DL (ref 14–18)
HYPOCHROMIA BLD QL SMEAR: ABNORMAL
IMM GRANULOCYTES # BLD AUTO: 0.09 K/UL (ref 0–0.04)
IMM GRANULOCYTES NFR BLD AUTO: 0.9 % (ref 0–0.5)
IRON SERPL-MCNC: 18 UG/DL (ref 45–160)
LYMPHOCYTES # BLD AUTO: 0.9 K/UL (ref 1–4.8)
LYMPHOCYTES NFR BLD: 9.6 % (ref 18–48)
MAGNESIUM SERPL-MCNC: 1.6 MG/DL (ref 1.6–2.6)
MCH RBC QN AUTO: 25.8 PG (ref 27–31)
MCHC RBC AUTO-ENTMCNC: 30.3 G/DL (ref 32–36)
MCV RBC AUTO: 85 FL (ref 82–98)
MONOCYTES # BLD AUTO: 0.6 K/UL (ref 0.3–1)
MONOCYTES NFR BLD: 5.9 % (ref 4–15)
NEUTROPHILS # BLD AUTO: 7.9 K/UL (ref 1.8–7.7)
NEUTROPHILS NFR BLD: 81.1 % (ref 38–73)
NRBC BLD-RTO: 0 /100 WBC
OVALOCYTES BLD QL SMEAR: ABNORMAL
PLATELET # BLD AUTO: 194 K/UL (ref 150–450)
PMV BLD AUTO: 10.6 FL (ref 9.2–12.9)
POIKILOCYTOSIS BLD QL SMEAR: SLIGHT
POTASSIUM SERPL-SCNC: 3.6 MMOL/L (ref 3.5–5.1)
PROT SERPL-MCNC: 5.4 G/DL (ref 6–8.4)
RBC # BLD AUTO: 2.98 M/UL (ref 4.6–6.2)
SATURATED IRON: 15 % (ref 20–50)
SODIUM SERPL-SCNC: 135 MMOL/L (ref 136–145)
TOTAL IRON BINDING CAPACITY: 121 UG/DL (ref 250–450)
TRANSFERRIN SERPL-MCNC: 82 MG/DL (ref 200–375)
VANCOMYCIN SERPL-MCNC: 8.7 UG/ML
VIT B12 SERPL-MCNC: 917 PG/ML (ref 210–950)
WBC # BLD AUTO: 9.71 K/UL (ref 3.9–12.7)

## 2022-03-17 PROCEDURE — 94761 N-INVAS EAR/PLS OXIMETRY MLT: CPT

## 2022-03-17 PROCEDURE — 25000003 PHARM REV CODE 250: Performed by: NURSE PRACTITIONER

## 2022-03-17 PROCEDURE — 63600175 PHARM REV CODE 636 W HCPCS: Performed by: INTERNAL MEDICINE

## 2022-03-17 PROCEDURE — 25000242 PHARM REV CODE 250 ALT 637 W/ HCPCS: Performed by: NURSE PRACTITIONER

## 2022-03-17 PROCEDURE — 96366 THER/PROPH/DIAG IV INF ADDON: CPT

## 2022-03-17 PROCEDURE — 25000003 PHARM REV CODE 250: Performed by: EMERGENCY MEDICINE

## 2022-03-17 PROCEDURE — 36415 COLL VENOUS BLD VENIPUNCTURE: CPT | Performed by: INTERNAL MEDICINE

## 2022-03-17 PROCEDURE — G0378 HOSPITAL OBSERVATION PER HR: HCPCS

## 2022-03-17 PROCEDURE — 84466 ASSAY OF TRANSFERRIN: CPT | Performed by: NURSE PRACTITIONER

## 2022-03-17 PROCEDURE — 63600175 PHARM REV CODE 636 W HCPCS: Performed by: EMERGENCY MEDICINE

## 2022-03-17 PROCEDURE — 99204 OFFICE O/P NEW MOD 45 MIN: CPT | Mod: ,,, | Performed by: INTERNAL MEDICINE

## 2022-03-17 PROCEDURE — 85025 COMPLETE CBC W/AUTO DIFF WBC: CPT | Performed by: NURSE PRACTITIONER

## 2022-03-17 PROCEDURE — 96361 HYDRATE IV INFUSION ADD-ON: CPT

## 2022-03-17 PROCEDURE — 94640 AIRWAY INHALATION TREATMENT: CPT

## 2022-03-17 PROCEDURE — 36415 COLL VENOUS BLD VENIPUNCTURE: CPT | Performed by: NURSE PRACTITIONER

## 2022-03-17 PROCEDURE — 99204 PR OFFICE/OUTPT VISIT, NEW, LEVL IV, 45-59 MIN: ICD-10-PCS | Mod: ,,, | Performed by: INTERNAL MEDICINE

## 2022-03-17 PROCEDURE — 25000003 PHARM REV CODE 250: Performed by: INTERNAL MEDICINE

## 2022-03-17 PROCEDURE — 83735 ASSAY OF MAGNESIUM: CPT | Performed by: NURSE PRACTITIONER

## 2022-03-17 PROCEDURE — 82607 VITAMIN B-12: CPT | Performed by: NURSE PRACTITIONER

## 2022-03-17 PROCEDURE — 86901 BLOOD TYPING SEROLOGIC RH(D): CPT | Performed by: NURSE PRACTITIONER

## 2022-03-17 PROCEDURE — 80202 ASSAY OF VANCOMYCIN: CPT | Performed by: INTERNAL MEDICINE

## 2022-03-17 PROCEDURE — 82728 ASSAY OF FERRITIN: CPT | Performed by: NURSE PRACTITIONER

## 2022-03-17 PROCEDURE — 80053 COMPREHEN METABOLIC PANEL: CPT | Performed by: NURSE PRACTITIONER

## 2022-03-17 RX ORDER — MEMANTINE HYDROCHLORIDE 5 MG/1
5 TABLET ORAL 2 TIMES DAILY
Status: DISCONTINUED | OUTPATIENT
Start: 2022-03-17 | End: 2022-03-18 | Stop reason: HOSPADM

## 2022-03-17 RX ORDER — IPRATROPIUM BROMIDE AND ALBUTEROL SULFATE 2.5; .5 MG/3ML; MG/3ML
3 SOLUTION RESPIRATORY (INHALATION) EVERY 6 HOURS
Status: DISCONTINUED | OUTPATIENT
Start: 2022-03-17 | End: 2022-03-18 | Stop reason: HOSPADM

## 2022-03-17 RX ORDER — ONDANSETRON 4 MG/1
4 TABLET, FILM COATED ORAL ONCE
Status: COMPLETED | OUTPATIENT
Start: 2022-03-17 | End: 2022-03-17

## 2022-03-17 RX ORDER — TALC
9 POWDER (GRAM) TOPICAL NIGHTLY PRN
Status: DISCONTINUED | OUTPATIENT
Start: 2022-03-17 | End: 2022-03-18 | Stop reason: HOSPADM

## 2022-03-17 RX ORDER — BUDESONIDE 0.5 MG/2ML
0.5 INHALANT ORAL EVERY 12 HOURS
Status: DISCONTINUED | OUTPATIENT
Start: 2022-03-17 | End: 2022-03-18 | Stop reason: HOSPADM

## 2022-03-17 RX ORDER — MIDODRINE HYDROCHLORIDE 5 MG/1
5 TABLET ORAL 3 TIMES DAILY
Status: DISCONTINUED | OUTPATIENT
Start: 2022-03-17 | End: 2022-03-18 | Stop reason: HOSPADM

## 2022-03-17 RX ORDER — OLOPATADINE HYDROCHLORIDE 1 MG/ML
1 SOLUTION/ DROPS OPHTHALMIC 2 TIMES DAILY
Status: DISCONTINUED | OUTPATIENT
Start: 2022-03-17 | End: 2022-03-18 | Stop reason: HOSPADM

## 2022-03-17 RX ORDER — ACETAMINOPHEN 325 MG/1
650 TABLET ORAL EVERY 6 HOURS PRN
Status: DISCONTINUED | OUTPATIENT
Start: 2022-03-17 | End: 2022-03-18 | Stop reason: HOSPADM

## 2022-03-17 RX ORDER — IPRATROPIUM BROMIDE AND ALBUTEROL SULFATE 2.5; .5 MG/3ML; MG/3ML
3 SOLUTION RESPIRATORY (INHALATION)
Status: DISCONTINUED | OUTPATIENT
Start: 2022-03-17 | End: 2022-03-18 | Stop reason: HOSPADM

## 2022-03-17 RX ORDER — FLUTICASONE PROPIONATE 50 MCG
1 SPRAY, SUSPENSION (ML) NASAL DAILY
Status: DISCONTINUED | OUTPATIENT
Start: 2022-03-17 | End: 2022-03-18 | Stop reason: HOSPADM

## 2022-03-17 RX ADMIN — MEMANTINE 5 MG: 5 TABLET ORAL at 08:03

## 2022-03-17 RX ADMIN — Medication 9 MG: at 10:03

## 2022-03-17 RX ADMIN — SODIUM CHLORIDE 500 ML: 0.9 INJECTION, SOLUTION INTRAVENOUS at 05:03

## 2022-03-17 RX ADMIN — OLOPATADINE HYDROCHLORIDE 1 DROP: 1 SOLUTION OPHTHALMIC at 10:03

## 2022-03-17 RX ADMIN — ONDANSETRON HYDROCHLORIDE 4 MG: 4 TABLET, FILM COATED ORAL at 05:03

## 2022-03-17 RX ADMIN — IPRATROPIUM BROMIDE AND ALBUTEROL SULFATE 3 ML: 2.5; .5 SOLUTION RESPIRATORY (INHALATION) at 07:03

## 2022-03-17 RX ADMIN — CEFEPIME HYDROCHLORIDE 1 G: 1 INJECTION, SOLUTION INTRAVENOUS at 04:03

## 2022-03-17 RX ADMIN — CEFEPIME HYDROCHLORIDE 1 G: 1 INJECTION, SOLUTION INTRAVENOUS at 12:03

## 2022-03-17 RX ADMIN — MIDODRINE HYDROCHLORIDE 5 MG: 5 TABLET ORAL at 10:03

## 2022-03-17 RX ADMIN — OLOPATADINE HYDROCHLORIDE 1 DROP: 1 SOLUTION OPHTHALMIC at 08:03

## 2022-03-17 RX ADMIN — MEMANTINE 5 MG: 5 TABLET ORAL at 10:03

## 2022-03-17 RX ADMIN — FLUTICASONE PROPIONATE 50 MCG: 50 SPRAY, METERED NASAL at 08:03

## 2022-03-17 RX ADMIN — BUDESONIDE 0.5 MG: 0.5 INHALANT ORAL at 07:03

## 2022-03-17 RX ADMIN — IPRATROPIUM BROMIDE AND ALBUTEROL SULFATE 3 ML: 2.5; .5 SOLUTION RESPIRATORY (INHALATION) at 01:03

## 2022-03-17 RX ADMIN — IPRATROPIUM BROMIDE AND ALBUTEROL SULFATE 3 ML: 2.5; .5 SOLUTION RESPIRATORY (INHALATION) at 12:03

## 2022-03-17 RX ADMIN — CEFEPIME HYDROCHLORIDE 1 G: 1 INJECTION, SOLUTION INTRAVENOUS at 09:03

## 2022-03-17 RX ADMIN — VANCOMYCIN HYDROCHLORIDE 500 MG: 500 INJECTION, POWDER, LYOPHILIZED, FOR SOLUTION INTRAVENOUS at 05:03

## 2022-03-17 NOTE — ASSESSMENT & PLAN NOTE
Likely secondary to decubitus wound infection  BC x2 pending  Continue IV vanc and cefepime for now  Monitor vitals closely  Continue IV resuscitation - monitor for fluid overload

## 2022-03-17 NOTE — ASSESSMENT & PLAN NOTE
Secondary to immobility   Wound care consulted   BC x2 pending  Social work consult for dc planning/hospice  Boost with meals   Maintain rectal tube

## 2022-03-17 NOTE — PROGRESS NOTES
Pharmacokinetic Initial Assessment: IV Vancomycin    Assessment/Plan:    Initiate intravenous vancomycin with loading dose of 1000 mg once followed by a maintenance dose of vancomycin 500 mg IV every 12 hours  Desired empiric serum trough concentration is 10 to 20 mcg/mL  Draw vancomycin trough level 60 min prior to fourth dose on 3/18 at approximately 0415  Pharmacy will continue to follow and monitor vancomycin.      Please contact pharmacy at extension [phone] with any questions regarding this assessment.     Thank you for the consult,   Oneida Navarro       Patient brief summary:  Abdoulaye Nguyen Sr. is a 76 y.o. male initiated on antimicrobial therapy with IV Vancomycin for treatment of suspected skin & soft tissue infection    Drug Allergies:   Review of patient's allergies indicates:   Allergen Reactions    Relafen [nabumetone] Hives and Rash       Actual Body Weight:   44 kg     Renal Function:   Estimated Creatinine Clearance: 80.7 mL/min (based on SCr of 0.5 mg/dL).,     Dialysis Method (if applicable):  N/A    CBC (last 72 hours):  Recent Labs   Lab Result Units 03/16/22  1509   WBC K/uL 10.66   Hemoglobin g/dL 7.2*   Hematocrit % 23.3*   Platelets K/uL 222   Gran % % 82.5*   Lymph % % 9.8*   Mono % % 5.6   Eosinophil % % 0.9   Basophil % % 0.4   Differential Method  Automated       Metabolic Panel (last 72 hours):  Recent Labs   Lab Result Units 03/16/22  1509 03/16/22  2026   Sodium mmol/L 135*  --    Potassium mmol/L 4.0  --    Chloride mmol/L 103  --    CO2 mmol/L 27  --    Glucose mg/dL 106  --    Glucose, UA   --  Negative   BUN mg/dL 12  --    Creatinine mg/dL 0.5  --    Albumin g/dL 1.1*  --    Total Bilirubin mg/dL 0.4  --    Alkaline Phosphatase U/L 81  --    AST U/L 13  --    ALT U/L 9*  --    Magnesium mg/dL 1.7  --        Drug levels (last 3 results):  No results for input(s): VANCOMYCINRA, VANCOMYCINPE, VANCOMYCINTR in the last 72 hours.    Microbiologic Results:  Microbiology Results  (last 7 days)       Procedure Component Value Units Date/Time    Blood culture x two cultures. Draw prior to antibiotics. [[id number]] Collected: 03/16/22 1614    Order Status: Sent Specimen: Blood from Peripheral, Lower Arm, Right Updated: 03/16/22 1614    Blood culture x two cultures. Draw prior to antibiotics. [[id number]] Collected: 03/16/22 1509    Order Status: Sent Specimen: Blood from Line, Femoral, Left Updated: 03/16/22 1519

## 2022-03-17 NOTE — HPI
Abdoulaye Nguyen is a 77 y/o M with hx of dementia, decubitus, anemia, GERD, asthma, PTSD, HLD, COPD, Parkinson's and R hip fracture presented to the ED in [facility] with complaints with gradually increased generalized weakness and poor PO intake [facility] for infected sacral wound. Symptoms are progressive and moderate in severity. No mitigating or exacerbating factors. ROS is limited by pt's dementia/mental status - some information obtained from wife and daughter at bedside. Found in ER to have BP of 83/53 and RR of 24. Pt afebrile. WBCs 10.66; H&H 7.2&23.2; Na 136; K 4; BUN 12; Cr 0.5; . CXR unremarkable. CT showed distended colon, possible distal obstruction, moderate amount of stool, mild basilar pleural effusion, no evidence of obstruction. EKG showed NSR at 94. COVID-19 negative. UA uninfected. In ER, pt was given cefepime, vanc and flagyl and sepsis IVF bolus - ibrahim catheter was placed. Pt BP improved to 108/67 and RR to 12. Hospital Medicine was called and pt was transmitted to [facility] and placed in Observation with telemetry monitoring - tolerated well. Seen and examined at bedside. NADN, denies pain, family at bedside. Pt is DNR, surrogate decision maker is his wife, Bell Nguyen.

## 2022-03-17 NOTE — CONSULTS
[facility] - Telemetry ([facility])  Gastroenterology  Consult Note    Patient Name: Abdoulaye Nguyen Sr.  MRN: [id number]  Admission Date: 3/16/2022  Hospital Length of Stay: 0 days  Code Status: DNR   Attending Provider: Mara Garcia MD   Consulting Provider: Melyssa Gomez PA-C  Primary Care Physician: Cyndi Veronica MD  Principal Problem:Severe sepsis    Inpatient consult to Gastroenterology  Consult performed by: Melyssa Gomez PA-C  Consult ordered by: Rochelle Yates DO  Reason for consult: dilated sigmoid        Subjective:     HPI:  Patient is a 75yo male with PMHx including Parkinsons, dementia, fall who was brought to the ED by his family for weakness and hypotension. Patient is nonverbal so history obtained from wife. She reports that they recently lived in [place]. Patient has had diagnosis of Parkinsons, however, was ambulatory prior to fall that he took in October 2021 requiring hip replacement. He was then sent to rehab facility where he suffered from 2 subluxations of the same hip. He eventually had repeat surgery to remove the hardware. Wife reports since this incident, patient has declined. He is less responsive, nonambulatory. He is able to respond to simple questions, however he does not hold any conversation. Once patient was released from the hospital, they moved back to [place] which is where they are from. Wife has help from one of their daughters to care for the patient.  Upon arrival to the ED, patient was found to have a BP of 76/51. He also has numerous decubitus ulcerations (photos in chart). CT was completed which showed the following:  -Severe gaseous distension of the sigmoid colon up to 14.8 cm may relate to distal obstruction.  Recommend clinical correlation and follow-up  -Chronic dislocation and inflammatory change involving the left hip with large associated fluid collection may reflect inflamed synovium.  Underlying abscess not excluded.  Dystrophic calcification  "involving the left hip.  -Moderate amount of stool in the colon  -Decubitus ulcer in the presacral region  -Mild basilar pleural effusions.  -Moderate anasarca  -Bilateral pelviectasis or mild hydronephrosis with a 8 mm left renal pelvic stone. Distended bladder with thickened walls.  GI was consulted due to dilated sigmoid colon with concern for distal obstruction. In the ED rectal exam completed with some release of gas. Wife reports that prior to his initial hip surgery in November, there was concern for intestinal obstruction. She notes that they "checked it" but there were no findings that she is aware of.   Wife and daughter both confirm today that he has been having a couple stools per day which are closer to diarrhea. Deny blood in the stool, vomiting. Simon is currently in place. He has been started on abx therapy due to severe decubitus ulcer.   Most recent BP remains low.       Past Medical History:   Diagnosis Date    Anemia, unspecified     Asthma     COPD (chronic obstructive pulmonary disease)     Decubitus ulcer     Dementia due to Parkinson's disease without behavioral disturbance     Encounter for blood transfusion     GERD (gastroesophageal reflux disease)     Hip dislocation, left chronic     HLD (hyperlipidemia)     Parkinson's disease     PTSD (post-traumatic stress disorder)        Past Surgical History:   Procedure Laterality Date    BACK SURGERY      TOTAL HIP ARTHROPLASTY Left     x3       Review of patient's allergies indicates:   Allergen Reactions    Relafen [nabumetone] Hives and Rash     Family History       Problem Relation (Age of Onset)    Cancer Father    Heart failure Mother          Tobacco Use    Smoking status: Never Smoker    Smokeless tobacco: Never Used   Substance and Sexual Activity    Alcohol use: No    Drug use: No    Sexual activity: Not Currently     Partners: Female     Limited ROS from wife and daughter.  Review of Systems   Unable to perform ROS: " Dementia   Respiratory:  Negative for shortness of breath.    Gastrointestinal:  Positive for constipation and diarrhea. Negative for abdominal distention, blood in stool and vomiting.     Objective:     Vital Signs (Most Recent):  Temp: 97.3 °F (36.3 °C) (03/17/22 0753)  Pulse: 76 (03/17/22 0753)  Resp: 16 (03/17/22 0753)  BP: (!) 93/58 (03/17/22 0753)  SpO2: (!) 94 % (03/17/22 0753)   Vital Signs (24h Range):  Temp:  [97.1 °F (36.2 °C)-98.6 °F (37 °C)] 97.3 °F (36.3 °C)  Pulse:  [72-98] 76  Resp:  [11-24] 16  SpO2:  [94 %-100 %] 94 %  BP: ()/(53-71) 93/58     Weight: 64.8 kg (142 lb 13.7 oz) (03/17/22 0416)  Body mass index is 22.37 kg/m².      Intake/Output Summary (Last 24 hours) at 3/17/2022 0925  Last data filed at 3/17/2022 0600  Gross per 24 hour   Intake 1670 ml   Output 250 ml   Net 1420 ml       Lines/Drains/Airways       Drain  Duration                  Urethral Catheter 03/16/22 2015 16 Fr. <1 day              Peripheral Intravenous Line  Duration                  Peripheral IV - Single Lumen 03/16/22 20 G Left Hand 1 day         Peripheral IV - Single Lumen 03/16/22 1509 20 G Left Forearm <1 day                    Physical Exam  Constitutional:       General: He is not in acute distress.     Appearance: He is not toxic-appearing or diaphoretic.   HENT:      Head: Normocephalic and atraumatic.   Eyes:      General: No scleral icterus.     Extraocular Movements: Extraocular movements intact.   Cardiovascular:      Rate and Rhythm: Normal rate and regular rhythm.   Pulmonary:      Effort: Pulmonary effort is normal. No respiratory distress.   Abdominal:      General: Bowel sounds are normal. There is no distension.      Palpations: Abdomen is soft. There is no mass.   Musculoskeletal:      Right lower leg: Edema (1+) present.      Left lower leg: Edema (1+) present.   Skin:     General: Skin is warm and dry.      Comments: Decubitus ulcers per chart review. Not directly visualized on exam.    Neurological:      Mental Status: He is alert. Mental status is at baseline.       Significant Labs:  CBC:   Recent Labs   Lab 03/16/22  1509 03/17/22  0532   WBC 10.66 9.71   HGB 7.2* 7.7*   HCT 23.3* 25.4*    194     CMP:   Recent Labs   Lab 03/17/22  0532   GLU 72   CALCIUM 7.7*   ALBUMIN 1.1*   PROT 5.4*   *   K 3.6   CO2 25      BUN 9   CREATININE 0.5   ALKPHOS 82   ALT 6*   AST 19   BILITOT 0.3     Coagulation:   Recent Labs   Lab 03/16/22  1509   INR 1.2   APTT 30.6       Significant Imaging:  Imaging results within the past 24 hours have been reviewed.    Assessment/Plan:     * Severe sepsis  -Management per .      Colon distention, sigmoid  -Would like to complete decompression of the sigmoid and evaluation for obstruction via endoscopy, however patient continues with hypotension. Will discuss further with attending physician, Dr. Friedman.  -Discussed plan with family. They are aware that procedure can only be completed if patient is stable.   -Keep NPO  -Transfuse to keep hgb >7  -continue to monitor BP.    -See also progress note placed by Dr. Friedman 3/16 regarding plan for the patient.    Anemia  -Continue to monitor H/H.  -Transfuse as needed.  -No overt bleeding        Thank you for your consult. I will follow-up with patient. Please contact us if you have any additional questions.    Melyssa Gomez PA-C  Gastroenterology  [facility] - Telemetry ([facility])

## 2022-03-17 NOTE — ASSESSMENT & PLAN NOTE
Possibly secondary to open/bleeding wounds  Type and screen sent   Monitor for excessive bleeding  Trend H&H - transfuse as indicated   Iron studies pending

## 2022-03-17 NOTE — CONSULTS
Fracisco met with patient and daughter, Lizzie, at the bedside. Family not interested in hospice consult at this time.     Stefanir to continue following.

## 2022-03-17 NOTE — ASSESSMENT & PLAN NOTE
No evidence of acute exacerbation   Supplemental O2 PRN  Duonebs scheduled and PRN  Pulmicort nebs BID

## 2022-03-17 NOTE — ASSESSMENT & PLAN NOTE
Appears stable at baseline  Continue home [facility]  Palliative care/Hospice consult per family request  Pt is DNR

## 2022-03-17 NOTE — SUBJECTIVE & OBJECTIVE
Past Medical History:   Diagnosis Date    Anemia, unspecified     Asthma     COPD (chronic obstructive pulmonary disease)     Decubitus ulcer     Dementia due to Parkinson's disease without behavioral disturbance     Encounter for blood transfusion     GERD (gastroesophageal reflux disease)     Hip dislocation, left chronic     HLD (hyperlipidemia)     Parkinson's disease     PTSD (post-traumatic stress disorder)        Past Surgical History:   Procedure Laterality Date    BACK SURGERY      TOTAL HIP ARTHROPLASTY Left     x3       Review of patient's allergies indicates:   Allergen Reactions    Relafen [nabumetone] Hives and Rash     Family History       Problem Relation (Age of Onset)    Cancer Father    Heart failure Mother          Tobacco Use    Smoking status: Never Smoker    Smokeless tobacco: Never Used   Substance and Sexual Activity    Alcohol use: No    Drug use: No    Sexual activity: Not Currently     Partners: Female     Limited ROS from wife and daughter.  Review of Systems   Unable to perform ROS: Dementia   Respiratory:  Negative for shortness of breath.    Gastrointestinal:  Positive for constipation and diarrhea. Negative for abdominal distention, blood in stool and vomiting.     Objective:     Vital Signs (Most Recent):  Temp: 97.3 °F (36.3 °C) (03/17/22 0753)  Pulse: 76 (03/17/22 0753)  Resp: 16 (03/17/22 0753)  BP: (!) 93/58 (03/17/22 0753)  SpO2: (!) 94 % (03/17/22 0753)   Vital Signs (24h Range):  Temp:  [97.1 °F (36.2 °C)-98.6 °F (37 °C)] 97.3 °F (36.3 °C)  Pulse:  [72-98] 76  Resp:  [11-24] 16  SpO2:  [94 %-100 %] 94 %  BP: ()/(53-71) 93/58     Weight: 64.8 kg (142 lb 13.7 oz) (03/17/22 0416)  Body mass index is 22.37 kg/m².      Intake/Output Summary (Last 24 hours) at 3/17/2022 0925  Last data filed at 3/17/2022 0600  Gross per 24 hour   Intake 1670 ml   Output 250 ml   Net 1420 ml       Lines/Drains/Airways       Drain  Duration                  Urethral Catheter 03/16/22 2015 16  Fr. <1 day              Peripheral Intravenous Line  Duration                  Peripheral IV - Single Lumen 03/16/22 20 G Left Hand 1 day         Peripheral IV - Single Lumen 03/16/22 1509 20 G Left Forearm <1 day                    Physical Exam  Constitutional:       General: He is not in acute distress.     Appearance: He is not toxic-appearing or diaphoretic.   HENT:      Head: Normocephalic and atraumatic.   Eyes:      General: No scleral icterus.     Extraocular Movements: Extraocular movements intact.   Cardiovascular:      Rate and Rhythm: Normal rate and regular rhythm.   Pulmonary:      Effort: Pulmonary effort is normal. No respiratory distress.   Abdominal:      General: Bowel sounds are normal. There is no distension.      Palpations: Abdomen is soft. There is no mass.   Musculoskeletal:      Right lower leg: Edema (1+) present.      Left lower leg: Edema (1+) present.   Skin:     General: Skin is warm and dry.      Comments: Decubitus ulcers per chart review. Not directly visualized on exam.   Neurological:      Mental Status: He is alert. Mental status is at baseline.       Significant Labs:  CBC:   Recent Labs   Lab 03/16/22  1509 03/17/22  0532   WBC 10.66 9.71   HGB 7.2* 7.7*   HCT 23.3* 25.4*    194     CMP:   Recent Labs   Lab 03/17/22  0532   GLU 72   CALCIUM 7.7*   ALBUMIN 1.1*   PROT 5.4*   *   K 3.6   CO2 25      BUN 9   CREATININE 0.5   ALKPHOS 82   ALT 6*   AST 19   BILITOT 0.3     Coagulation:   Recent Labs   Lab 03/16/22  1509   INR 1.2   APTT 30.6       Significant Imaging:  Imaging results within the past 24 hours have been reviewed.

## 2022-03-17 NOTE — SUBJECTIVE & OBJECTIVE
Past Medical History:   Diagnosis Date    Anemia, unspecified     Asthma     COPD (chronic obstructive pulmonary disease)     Decubitus ulcer     Dementia due to Parkinson's disease without behavioral disturbance     Encounter for blood transfusion     GERD (gastroesophageal reflux disease)     Hip dislocation, left chronic     HLD (hyperlipidemia)     Parkinson's disease     PTSD (post-traumatic stress disorder)        Past Surgical History:   Procedure Laterality Date    BACK SURGERY      TOTAL HIP ARTHROPLASTY Left     x3       Review of patient's allergies indicates:   Allergen Reactions    Relafen [nabumetone] Hives and Rash       No current facility-administered medications on file prior to encounter.     Current Outpatient Medications on File Prior to Encounter   Medication Sig    fluticasone (FLONASE) 50 mcg/actuation nasal spray 1 spray by Each Nare route once daily.    ketotifen (ZADITOR) 0.025 % ophthalmic solution Place 1 drop into both eyes 2 (two) times daily.    memantine (NAMENDA) 10 MG Tab Take 5 mg by mouth 2 (two) times daily.    potassium chloride 40 mEq/15 mL Liqd Take 20 mEq by mouth once daily.    psyllium (HYDROCIL) packet Take 1 packet by mouth once daily.    sodium hypochlorite 0.5 % (DAKIN'S SOLUTION) external solution 10 mLs.    sulfamethoxazole-trimethoprim 800-160mg (BACTRIM DS) 800-160 mg Tab Take 1 tablet by mouth 2 (two) times daily.    tiotropium (SPIRIVA) 18 mcg inhalation capsule Inhale 18 mcg into the lungs once daily.    albuterol (PROVENTIL) 2.5 mg /3 mL (0.083 %) nebulizer solution Take by nebulization 4 (four) times daily as needed.    albuterol 90 mcg/actuation inhaler Inhale 2 puffs into the lungs every 6 (six) hours as needed for Wheezing.    blood sugar diagnostic Strp by Misc.(Non-Drug; Combo Route) route.    budesonide-formoterol 160-4.5 mcg (SYMBICORT) 160-4.5 mcg/actuation HFAA Inhale 2 puffs into the lungs every 12 (twelve) hours.    ergocalciferol (ERGOCALCIFEROL)  "50,000 unit Cap Take 50,000 Units by mouth every 7 days.    loratadine (CLARITIN) 10 mg tablet Take 10 mg by mouth daily as needed.    montelukast (SINGULAIR) 10 mg tablet Take 10 mg by mouth once daily.    nitroGLYCERIN (NITROSTAT) 0.3 MG SL tablet Place 0.3 mg under the tongue every 5 (five) minutes as needed.    omeprazole (PRILOSEC) 20 MG capsule Take 20 mg by mouth once daily.    prednisoLONE acetate (PRED MILD) 0.12 % ophthalmic suspension Place 2 drops into both eyes 2 (two) times daily.    predniSONE (DELTASONE) 5 MG tablet Take 5 mg by mouth 2 (two) times daily.    ropinirole (REQUIP) 0.25 MG tablet Take 1 mg by mouth 2 (two) times daily. Take one tab for 2 days,  Then 2 tab for for 5 days, Then 4 tab for 30 days.    simvastatin (ZOCOR) 40 MG tablet Take 20 mg by mouth every evening.    terazosin (HYTRIN) 10 MG capsule Take 10 mg by mouth every evening.    trazodone (DESYREL) 100 MG tablet Take 150 mg by mouth every evening.     venlafaxine (EFFEXOR-XR) 75 MG 24 hr capsule Take 75 mg by mouth once daily. Take 3 capsules  by mouth every morning.     Family History       Problem Relation (Age of Onset)    Cancer Father    Heart failure Mother          Tobacco Use    Smoking status: Never Smoker    Smokeless tobacco: Never Used   Substance and Sexual Activity    Alcohol use: No    Drug use: No    Sexual activity: Not Currently     Partners: Female     Review of Systems   Reason unable to perform ROS: Information obtained from wife.   Constitutional:  Positive for activity change (bed bound), appetite change ("poor") and fatigue.   Skin:  Positive for wound.   Neurological:  Positive for weakness.   Objective:     Vital Signs (Most Recent):  Temp: 98.6 °F (37 °C) (03/16/22 1448)  Pulse: 86 (03/17/22 0018)  Resp: 12 (03/16/22 2232)  BP: (!) 90/58 (03/17/22 0018)  SpO2: 98 % (03/16/22 2232)   Vital Signs (24h Range):  Temp:  [98.6 °F (37 °C)] 98.6 °F (37 °C)  Pulse:  [72-98] 86  Resp:  [11-24] 12  SpO2:  [98 " %-100 %] 98 %  BP: ()/(53-71) 90/58     Weight: 44 kg (97 lb)  Body mass index is 15.19 kg/m².    Physical Exam  Vitals reviewed.   Constitutional:       General: He is awake. He is not in acute distress.     Appearance: Normal appearance. He is cachectic. He is ill-appearing. He is not toxic-appearing.      Comments: Frail, elderly   HENT:      Head: Normocephalic and atraumatic.      Nose: Nose normal. No congestion.      Mouth/Throat:      Mouth: Mucous membranes are moist.   Eyes:      General: No scleral icterus.     Pupils: Pupils are equal, round, and reactive to light.   Neck:      Comments: Severe kyphosis  Cardiovascular:      Rate and Rhythm: Normal rate and regular rhythm.      Pulses: Normal pulses.      Heart sounds: Normal heart sounds.   Pulmonary:      Effort: Pulmonary effort is normal. No respiratory distress.      Breath sounds: Normal breath sounds. No wheezing or rhonchi.   Abdominal:      General: Abdomen is flat. Bowel sounds are normal. There is no distension.      Palpations: Abdomen is soft.      Tenderness: There is no abdominal tenderness. There is no guarding.      Hernia: A hernia is present. Hernia is present in the right inguinal area (inguinal).   Musculoskeletal:         General: Deformity (LLE contracted) present. Normal range of motion.      Cervical back: Normal range of motion and neck supple.      Right lower leg: No edema.      Left lower leg: No edema.   Skin:     General: Skin is warm and dry.      Capillary Refill: Capillary refill takes less than 2 seconds.          Neurological:      General: No focal deficit present.      Mental Status: He is alert.      Cranial Nerves: No facial asymmetry.      Motor: Weakness present.      Gait: Gait abnormal.      Comments: Minimally responsive. Follows basic commands. Answers simple questions.    Psychiatric:         Attention and Perception: He is inattentive.         Mood and Affect: Mood normal. Affect is blunt and flat.          Speech: Speech is delayed.         Behavior: Behavior is withdrawn.         Cognition and Memory: Cognition is impaired. Memory is impaired.          Significant Labs:   Results for orders placed or performed during the hospital encounter of 03/16/22   CBC auto differential   Result Value Ref Range    WBC 10.66 3.90 - 12.70 K/uL    RBC 2.72 (L) 4.60 - 6.20 M/uL    Hemoglobin 7.2 (L) 14.0 - 18.0 g/dL    Hematocrit 23.3 (L) 40.0 - 54.0 %    MCV 86 82 - 98 fL    MCH 26.5 (L) 27.0 - 31.0 pg    MCHC 30.9 (L) 32.0 - 36.0 g/dL    RDW 17.6 (H) 11.5 - 14.5 %    Platelets 222 150 - 450 K/uL    MPV 9.4 9.2 - 12.9 fL    Immature Granulocytes 0.8 (H) 0.0 - 0.5 %    Gran # (ANC) 8.8 (H) 1.8 - 7.7 K/uL    Immature Grans (Abs) 0.09 (H) 0.00 - 0.04 K/uL    Lymph # 1.0 1.0 - 4.8 K/uL    Mono # 0.6 0.3 - 1.0 K/uL    Eos # 0.1 0.0 - 0.5 K/uL    Baso # 0.04 0.00 - 0.20 K/uL    nRBC 0 0 /100 WBC    Gran % 82.5 (H) 38.0 - 73.0 %    Lymph % 9.8 (L) 18.0 - 48.0 %    Mono % 5.6 4.0 - 15.0 %    Eosinophil % 0.9 0.0 - 8.0 %    Basophil % 0.4 0.0 - 1.9 %    Differential Method Automated    Comprehensive metabolic panel   Result Value Ref Range    Sodium 135 (L) 136 - 145 mmol/L    Potassium 4.0 3.5 - 5.1 mmol/L    Chloride 103 95 - 110 mmol/L    CO2 27 23 - 29 mmol/L    Glucose 106 70 - 110 mg/dL    BUN 12 8 - 23 mg/dL    Creatinine 0.5 0.5 - 1.4 mg/dL    Calcium 7.2 (L) 8.7 - 10.5 mg/dL    Total Protein 5.3 (L) 6.0 - 8.4 g/dL    Albumin 1.1 (L) 3.5 - 5.2 g/dL    Total Bilirubin 0.4 0.1 - 1.0 mg/dL    Alkaline Phosphatase 81 55 - 135 U/L    AST 13 10 - 40 U/L    ALT 9 (L) 10 - 44 U/L    Anion Gap 5 (L) 8 - 16 mmol/L    eGFR if African American >60.0 >60 mL/min/1.73 m^2    eGFR if non African American >60.0 >60 mL/min/1.73 m^2   Lactic acid, plasma #1   Result Value Ref Range    Lactate (Lactic Acid) 1.1 0.5 - 2.2 mmol/L   Troponin I   Result Value Ref Range    Troponin I 0.017 0.000 - 0.026 ng/mL   Procalcitonin   Result Value Ref Range     Procalcitonin 0.07 <0.25 ng/mL   Protime-INR   Result Value Ref Range    Prothrombin Time 12.4 9.0 - 12.5 sec    INR 1.2 0.8 - 1.2   APTT   Result Value Ref Range    aPTT 30.6 21.0 - 32.0 sec   Magnesium   Result Value Ref Range    Magnesium 1.7 1.6 - 2.6 mg/dL   Lactic acid, plasma #2   Result Value Ref Range    Lactate (Lactic Acid) 1.8 0.5 - 2.2 mmol/L   Urinalysis, Reflex to Urine Culture Urine, Catheterized    Specimen: Urine   Result Value Ref Range    Specimen UA Urine, Catheterized     Color, UA Yellow Yellow, Straw, Isela    Appearance, UA Clear Clear    pH, UA 8.0 5.0 - 8.0    Specific Gravity, UA 1.010 1.005 - 1.030    Protein, UA Negative Negative    Glucose, UA Negative Negative    Ketones, UA Negative Negative    Bilirubin (UA) Negative Negative    Occult Blood UA 3+ (A) Negative    Nitrite, UA Negative Negative    Urobilinogen, UA Negative <2.0 EU/dL    Leukocytes, UA Negative Negative   Urinalysis Microscopic   Result Value Ref Range    RBC, UA 7 (H) 0 - 4 /hpf    WBC, UA 0 0 - 5 /hpf    Microscopic Comment SEE COMMENT    POCT COVID-19 Rapid Screening   Result Value Ref Range    POC Rapid COVID Negative Negative     Acceptable Yes          Significant Imaging:   Imaging Results              CT Abdomen Pelvis  Without Contrast (Final result)  Result time 03/16/22 19:21:05      Final result by Branden Estrada MD (03/16/22 19:21:05)                   Impression:      As above    All CT scans   are performed using dose optimization techniques including the following: automated exposure control; adjustment of the mA and/or kV; use of iterative reconstruction technique.  Dose modulation was employed for ALARA by means of: Automated exposure control; adjustment of the mA and/or kV according to patient size (this includes techniques or standardized protocols for targeted exams where dose is matched to indication/reason for exam; i.e. extremities or head); and/or use of iterative reconstructive  technique.      Electronically signed by: Sean Otero  Date:    03/16/2022  Time:    19:21               Narrative:    EXAMINATION:  CT ABDOMEN PELVIS WITHOUT CONTRAST    CLINICAL HISTORY:  attempt central line right groin;    TECHNIQUE:  Low dose axial images, sagittal and coronal reformations were obtained from the lung bases to the pubic symphysis, 30 mL of oral Omnipaque 350 was administered..    COMPARISON:  Recent prior    FINDINGS:  In the right inguinal region there appears to be herniated small bowel loops in the inguinal hernia.  Compare series 2, image 157 of recent exam 2 series 2, image 145 of the current exam.  No foci of gas in the adjacent mesentery or soft tissues to suggest bowel perforation.  Remaining findings similar to prior exam.  Discussed with Dr. Yates.  Attention on follow-up.                                       CT Abdomen Pelvis With Contrast (Final result)  Result time 03/16/22 17:13:52      Final result by Branden Estrada MD (03/16/22 17:13:52)                   Impression:      Severe gaseous distension of the sigmoid colon up to 14.8 cm may relate to distal obstruction.  Recommend clinical correlation and follow-up    Chronic dislocation and inflammatory change involving the left hip with large associated fluid collection may reflect inflamed synovium.  Underlying abscess not excluded.  Dystrophic calcification involving the left hip.    Moderate amount of stool in the colon    Decubitus ulcer in the presacral region    Mild basilar pleural effusions.    Moderate anasarca    Bilateral pelviectasis or mild hydronephrosis with a 8 mm left renal pelvic stone. Distended bladder with thickened walls.    Recommend clinical correlation and follow-up    All CT scans at this facility use dose modulation, iterative reconstruction, and/or weight based dosing when appropriate to reduce radiation dose to as low as reasonably achievable.      Electronically signed by: Sean  Branden  Date:    03/16/2022  Time:    17:13               Narrative:    EXAMINATION:  CT ABDOMEN PELVIS WITH CONTRAST    CLINICAL HISTORY:  low air collected in the abdomen;    TECHNIQUE:  Low dose axial images, sagittal and coronal reformations were obtained from the lung bases to the pubic symphysis following the IV administration of 100 mL of Omnipaque 350.    COMPARISON:  None    FINDINGS:  Mild subcutaneous edema.  A suggestion of a decubitus ulcer in the a pre sacral region.  Chronic dislocation and inflammatory process involving the left hip joint.  Dystrophic calcification and fluid collection is also identified involving the left hip.  Bladder wall is thickened and distended.  Severe distension of the sigmoid colon measures up to 14.8 cm on the diameter on coronal view.  Distended colon with gas and stool.  Mild basilar pleural effusions.  Basilar atelectasis less likely early consolidation.  Are grossly unremarkable.  Moderate amount of stool in the colon.  Atherosclerotic changes noted.  Moderate anasarca.  Right-sided renal cyst.  Bilateral pelviectasis or mild hydronephrosis.  Left renal pelvic stone measures up to 8 mm.                                       X-Ray Chest AP Portable (Final result)  Result time 03/16/22 15:31:37      Final result by Vineet Palacios MD (03/16/22 15:31:37)                   Impression:      1.  Low lung volumes with vascular crowding present.  Lungs are otherwise clear.    2.  Large air collection underneath the hemidiaphragm most likely dilated stomach or loop of colon.  Clinical correlation is advised    3.  Stable findings as noted above.      Electronically signed by: Vineet Palacios MD  Date:    03/16/2022  Time:    15:31               Narrative:    EXAMINATION:  XR CHEST AP PORTABLE    CLINICAL HISTORY:  Sepsis;    COMPARISON:  July 6, 2017    FINDINGS:  Low lung volumes with mild vascular crowding present.  The lungs are clear. The cardiac silhouette size is normal.  The trachea is midline and the mediastinal width is normal. Negative for focal infiltrate, effusion or pneumothorax. Pulmonary vasculature is normal. Negative for osseous abnormalities. Tortuous aorta.  Multiple calcified hilar and mediastinal lymph nodes.    Large air collection underneath the hemidiaphragm, either markedly distended colon or stomach.                                    Results for orders placed or performed during the hospital encounter of 03/16/22   EKG 12-lead    Collection Time: 03/16/22  3:09 PM    Narrative    Test Reason : R53.1,    Vent. Rate : 094 BPM     Atrial Rate : 094 BPM     P-R Int : 136 ms          QRS Dur : 068 ms      QT Int : 332 ms       P-R-T Axes : 060 009 036 degrees     QTc Int : 415 ms    Normal sinus rhythm  Low voltage QRS  Nonspecific T wave abnormality  Abnormal ECG  No previous ECGs available  Confirmed by LUIS AGUIRRE MD ([id number]) on 3/16/2022 5:51:30 PM    Referred By: [email or web address]   SELF           Confirmed By:LUIS AGUIRRE MD

## 2022-03-17 NOTE — CONSULTS
Pharmacokinetic Assessment Follow Up: IV Vancomycin    Renal Function Assessment:   Estimated Creatinine Clearance: 118.8 mL/min (based on SCr of 0.5 mg/dL).,     Serum creatinine stable, however SCr is likley falsely low. This is an elderly bed-bound patient, so expected to have decreased muscle mass. CrCl may over-estimate true renal function in this patient.     Vancomycin Regimen Plan:    Discontinue the scheduled vancomycin regimen and re-dose when the random level is less than 20 mcg/mL, next level to be drawn at 1730 on 3/17/22.    Pharmacy will continue to follow and monitor vancomycin.    Please contact pharmacy at extension [phone] for questions regarding this assessment.    Thank you for the consult,   Bertha Salazar PharmD       Patient brief summary:  Abdoulaye Nguyen Sr. is a 76 y.o. male initiated on antimicrobial therapy with IV Vancomycin for treatment of sepsis likely secondary to decubitus wound infection.     The patient's current regimen is Vancomycin 500 mg IV every 12 hours. Patient received 20 mg/kg = 1000 mg loading dose, followed by 500 mg 12 hours later. Scheduled regimen changed to pulse dosing based on risk of accumulation on q12h dosing.     Drug Allergies:   Review of patient's allergies indicates:   Allergen Reactions    Relafen [nabumetone] Hives and Rash       Actual Body Weight: 64.8 kg    Renal Function:   Estimated Creatinine Clearance: 118.8 mL/min (based on SCr of 0.5 mg/dL).,     Dialysis Method (if applicable):  N/A    CBC (last 72 hours):  Recent Labs   Lab Result Units 03/16/22  1509 03/17/22  0532   WBC K/uL 10.66 9.71   Hemoglobin g/dL 7.2* 7.7*   Hematocrit % 23.3* 25.4*   Platelets K/uL 222 194   Gran % % 82.5*  --    Lymph % % 9.8*  --    Mono % % 5.6  --    Eosinophil % % 0.9  --    Basophil % % 0.4  --    Differential Method  Automated  --        Metabolic Panel (last 72 hours):  Recent Labs   Lab Result Units 03/16/22  1509 03/16/22 2026 03/17/22  0532   Sodium  mmol/L 135*  --  135*   Potassium mmol/L 4.0  --  3.6   Chloride mmol/L 103  --  105   CO2 mmol/L 27  --  25   Glucose mg/dL 106  --  72   Glucose, UA   --  Negative  --    BUN mg/dL 12  --  9   Creatinine mg/dL 0.5  --  0.5   Albumin g/dL 1.1*  --  1.1*   Total Bilirubin mg/dL 0.4  --  0.3   Alkaline Phosphatase U/L 81  --  82   AST U/L 13  --  19   ALT U/L 9*  --  6*   Magnesium mg/dL 1.7  --  1.6       Vancomycin Administrations:  vancomycin given in the last 96 hours                   vancomycin 500 mg in dextrose 5 % 100 mL IVPB (ready to mix system) (mg) 500 mg New Bag 03/17/22 0523    vancomycin in dextrose 5 % 1 gram/250 mL IVPB 1,000 mg (mg) 1,000 mg New Bag 03/16/22 9767                Microbiologic Results:  Microbiology Results (last 7 days)     Procedure Component Value Units Date/Time    Blood culture x two cultures. Draw prior to antibiotics. [[id number]] Collected: 03/16/22 1614    Order Status: Sent Specimen: Blood from Peripheral, Lower Arm, Right Updated: 03/17/22 0235    Blood culture x two cultures. Draw prior to antibiotics. [[id number]] Collected: 03/16/22 1509    Order Status: Sent Specimen: Blood from Line, Femoral, Left Updated: 03/17/22 0235        Thank you for allowing us to participate in this patient's care.     Bertha Salazar PharmD 03/17/2022 8:11 AM

## 2022-03-17 NOTE — ASSESSMENT & PLAN NOTE
CT - severe gaseous distention of sigmoid colon - possible distal obstruction   GI consulted   General Surgery consulted   Maintain rectal tube

## 2022-03-17 NOTE — HPI
Patient is a 77yo male with PMHx including Parkinsons, dementia, fall who was brought to the ED by his family for weakness and hypotension. Patient is nonverbal so history obtained from wife. She reports that they recently lived in [place]. Patient has had diagnosis of Parkinsons, however, was ambulatory prior to fall that he took in October 2021 requiring hip replacement. He was then sent to rehab facility where he suffered from 2 subluxations of the same hip. He eventually had repeat surgery to remove the hardware. Wife reports since this incident, patient has declined. He is less responsive, nonambulatory. He is able to respond to simple questions, however he does not hold any conversation. Once patient was released from the hospital, they moved back to [place] which is where they are from. Wife has help from one of their daughters to care for the patient.  Upon arrival to the ED, patient was found to have a BP of 76/51. He also has numerous decubitus ulcerations (photos in chart). CT was completed which showed the following:  -Severe gaseous distension of the sigmoid colon up to 14.8 cm may relate to distal obstruction.  Recommend clinical correlation and follow-up  -Chronic dislocation and inflammatory change involving the left hip with large associated fluid collection may reflect inflamed synovium.  Underlying abscess not excluded.  Dystrophic calcification involving the left hip.  -Moderate amount of stool in the colon  -Decubitus ulcer in the presacral region  -Mild basilar pleural effusions.  -Moderate anasarca  -Bilateral pelviectasis or mild hydronephrosis with a 8 mm left renal pelvic stone. Distended bladder with thickened walls.  GI was consulted due to dilated sigmoid colon with concern for distal obstruction. In the ED rectal exam completed with some release of gas. Wife reports that prior to his initial hip surgery in November, there was concern for intestinal obstruction. She notes that  "they "checked it" but there were no findings that she is aware of.   Wife and daughter both confirm today that he has been having a couple stools per day which are closer to diarrhea. Deny blood in the stool, vomiting. Simon is currently in place. He has been started on abx therapy due to severe decubitus ulcer.   Most recent BP remains low.   "

## 2022-03-17 NOTE — PLAN OF CARE
[facility] - Telemetry (Hospital)  Initial Discharge Assessment       Primary Care Provider: Cyndi Veronica MD    Admission Diagnosis: Urinary retention [R33.9]  Weakness [R53.1]  Right inguinal hernia [K40.90]  Abnormal CT scan, sigmoid colon [R93.3]  Fecal impaction in rectum [K56.41]  Hypotension, unspecified hypotension type [I95.9]  Dementia without behavioral disturbance, unspecified dementia type [F03.90]  Anemia, unspecified type [D64.9]  Pressure injury of skin, unspecified injury stage, unspecified location [L89.90]    Admission Date: 3/16/2022  Expected Discharge Date:     Discharge Barriers Identified: None    Payor: MEDICARE / Plan: MEDICARE PART A & B / Product Type: Government /     Extended Emergency Contact Information  Primary Emergency Contact: Bell Nguyen  Address: [address] [place] [id number] [facility]  Home Phone: [phone]  Mobile Phone: [phone]  Relation: Spouse    Discharge Plan A: Skilled Nursing Facility       No Pharmacies Listed    Initial Assessment (most recent)     Adult Discharge Assessment - 03/17/22 1122        Discharge Assessment    Assessment Type Discharge Planning Assessment     Confirmed/corrected address, phone number and insurance Yes     Confirmed Demographics Correct on Facesheet     Source of Information family     Communicated ANA LILIA with patient/caregiver Date not available/Unable to determine     Reason For Admission Severe sepsis     Lives With child(valentina), adult;spouse;grandchild(valentina)     Facility Arrived From: Home     Do you expect to return to your current living situation? Other (see comments)   Family interested in SNF    Do you have help at home or someone to help you manage your care at home? Yes     Who are your caregiver(s) and their phone number(s)? Pt's spouse and daughter     Prior to hospitilization cognitive status: Unable to Assess     Current cognitive status: Unable to Assess     Walking or Climbing Stairs  "Difficulty ambulation difficulty, dependent;stair climbing difficulty, dependent;transferring difficulty, dependent     Mobility Management Primarily bed bound     Dressing/Bathing Difficulty bathing difficulty, dependent;dressing difficulty, dependent     Dressing/Bathing Management Caregivers assist with hygiene needs     Readmission within 30 days? No     Patient currently being followed by outpatient case management? No     Do you currently have service(s) that help you manage your care at home? Yes     How Many hours does patient receive services --   16 hrs    [facility] and Contact number of [facility]; 4 hrs/ day M-Th/F (16 hours total)     Is the pt/caregiver preference to resume services with current agency Yes     Do you take prescription medications? Yes     Do you have prescription coverage? Yes     Do you have any problems affording any of your prescribed medications? No     Who is going to help you get home at discharge? Dependent on hospital course     Are you on dialysis? No     Discharge Plan A Skilled Nursing Facility     DME Needed Upon Discharge  none     Discharge Plan discussed with: Patient;Adult children     Discharge Barriers Identified None               Swer met with patient and daughter, Lizzie, at the bedside to complete discharge assessment. Patient did not participate in discussion. Lizzie state that patient lives at home with her, pt's spouse and minor grandchildren. Lizzie state that patient has been bed bound since moving to [place] from [place] in December. Lizzie reports that family has been wanting to get PT/OT arranged but this process has been taking a while. Lizzie reports that [facility] Patient has 16 hours of services weekly; 4hrs daily M-Th/F. Caregivers assist with ADLs (changing briefs, hygiene, feeding etc). [facility]er to f[facility]u with [facility]  to confirm patient's VA coverage since none is documented in Epic.     Swer updated " patient's whiteboard to reflect discharge plan and SW contact information. CM needs to be determined by hospital progress.     Swer to continue following.

## 2022-03-17 NOTE — ASSESSMENT & PLAN NOTE
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

## 2022-03-17 NOTE — PROGRESS NOTES
76-year-old male with a past medical history of dementia decubitus, anemia, bilateral hip displacement admit admitted for severe sepsis.  Patient appears cachectic and has poor p.o. intake [facility].  Here he is started on vanc and cefepime for severe sepsis.  Abdomen distended in the-CT of abdomen showed severe gaseous distension.  GI and General surgery consult.  During my discussion with the family and the patient the family reported that he was in [place] until December 2021 and was placed in a SNF facility after his hip surgery.  This is where he developed a decubitus ulcer.  The patient and his wife then relocated to [place] to be closer to his daughter.   The daughter states that the patient has been receiving home health and hospice care at home.  I explained to the patient that hospice care does not involve physical therapy or occupational therapy and only involves comfort care.  Daughter states that they were using hospice as part of and added service to the home health they were receiving.  Daughter states that the patient has never been adequately rehabilitated and would like a PT/OT evaluation. The patient is extremely ill and cachectic appearing.  I explained to the patient and the family that in order to qualify for SNF placement the patient will have to participate in therapy and will have to have some sort form of nutritional intake.    I even explained that if he wishes to continue pursuing he is SNF placement and has no p.o. intake an NG tube and ultimately a PEG tube might also have to be placed.  Family and patient want to proceed with all available treatment options at this time.  Patient is a DNR.            And family even states that they have been doing some leg movements with a stepper with the patient at home and that he is able to sit up in a recliner.  He has a poor prognosis but for now will continue all treatment  options.    H/H improved but in the setting of an active infection IV venofer not an option. Transfuse as needed to keep Hgb >7.  Blood cultures x2 no growth to date  CT abdomen in the ED showed severe gastric distention of sigmoid colon possible distal obstruction and a fecal tube was placed.  On exam the patient did not endorse any abdominal discomfort with deep palpation.   He will have be medically stabilized before surgery is an option.

## 2022-03-17 NOTE — ASSESSMENT & PLAN NOTE
-Would like to complete decompression of the sigmoid and evaluation for obstruction via endoscopy, however patient continues with hypotension. Will discuss further with attending physician, Dr. Friedman.  -Discussed plan with family. They are aware that procedure can only be completed if patient is stable.   -Keep NPO  -Transfuse to keep hgb >7  -continue to monitor BP.    -See also progress note placed by Dr. Friedman 3/16 regarding plan for the patient.

## 2022-03-17 NOTE — H&P
[facility]  History & Physical    Patient Name: Abdoulaye Nguyen Sr.  MRN: [id number]  Patient Class: OP- Observation  Admission Date: 3/16/2022  Attending Physician: Mara Garcia MD   Primary Care Provider: Cyndi Veronica MD         Patient information was obtained from spouse/SO, past medical records and ER records.     Subjective:     Principal Problem:Severe sepsis    Chief Complaint:   Chief Complaint   Patient presents with    Hypotension     Pt has hx dementia, parkinsons, bed bound. [facility] after being treated for complications with sacral wound. Pt has not been eating or drinking much. BP low. Pt transported to ER by [facility]. Initial BP was 76/51. [facility] administered about 250ml NS and BP up to 95/61. Pt normally does not speak much per family. Unable to answer most questions. Followed command to open mouth for temp.         HPI: Abdoulaye Nguyen is a 77 y/o M with hx of dementia, decubitus, anemia, GERD, asthma, PTSD, HLD, COPD, Parkinson's and R hip fracture presented to the ED in [facility] with complaints with gradually increased generalized weakness and poor PO intake [facility] for infected sacral wound. Symptoms are progressive and moderate in severity. No mitigating or exacerbating factors. ROS is limited by pt's dementia/mental status - some information obtained from wife and daughter at bedside. Found in ER to have BP of 83/53 and RR of 24. Pt afebrile. WBCs 10.66; H&H 7.2&23.2; Na 136; K 4; BUN 12; Cr 0.5; . CXR unremarkable. CT showed distended colon, possible distal obstruction, moderate amount of stool, mild basilar pleural effusion, no evidence of obstruction. EKG showed NSR at 94. COVID-19 negative. UA uninfected. In ER, pt was given cefepime, vanc and flagyl and sepsis IVF bolus - ibrahim catheter was placed. Pt BP improved to 108/67 and RR to 12. Hospital Medicine was  called and pt was transmitted to [facility] and placed in Observation with telemetry monitoring - tolerated well. Seen and examined at bedside. NADN, denies pain, family at bedside. Pt is DNR, surrogate decision maker is his wife, Bell Nguyen.       Past Medical History:   Diagnosis Date    Anemia, unspecified     Asthma     COPD (chronic obstructive pulmonary disease)     Decubitus ulcer     Dementia due to Parkinson's disease without behavioral disturbance     Encounter for blood transfusion     GERD (gastroesophageal reflux disease)     Hip dislocation, left chronic     HLD (hyperlipidemia)     Parkinson's disease     PTSD (post-traumatic stress disorder)        Past Surgical History:   Procedure Laterality Date    BACK SURGERY      TOTAL HIP ARTHROPLASTY Left     x3       Review of patient's allergies indicates:   Allergen Reactions    Relafen [nabumetone] Hives and Rash       No current facility-administered medications on file prior to encounter.     Current Outpatient Medications on File Prior to Encounter   Medication Sig    fluticasone (FLONASE) 50 mcg/actuation nasal spray 1 spray by Each Nare route once daily.    ketotifen (ZADITOR) 0.025 % ophthalmic solution Place 1 drop into both eyes 2 (two) times daily.    memantine (NAMENDA) 10 MG Tab Take 5 mg by mouth 2 (two) times daily.    potassium chloride 40 mEq/15 mL Liqd Take 20 mEq by mouth once daily.    psyllium (HYDROCIL) packet Take 1 packet by mouth once daily.    sodium hypochlorite 0.5 % (DAKIN'S SOLUTION) external solution 10 mLs.    sulfamethoxazole-trimethoprim 800-160mg (BACTRIM DS) 800-160 mg Tab Take 1 tablet by mouth 2 (two) times daily.    tiotropium (SPIRIVA) 18 mcg inhalation capsule Inhale 18 mcg into the lungs once daily.    albuterol (PROVENTIL) 2.5 mg /3 mL (0.083 %) nebulizer solution Take by nebulization 4 (four) times daily as needed.    albuterol 90 mcg/actuation inhaler Inhale 2 puffs into the lungs every 6  "(six) hours as needed for Wheezing.    blood sugar diagnostic Strp by Misc.(Non-Drug; Combo Route) route.    budesonide-formoterol 160-4.5 mcg (SYMBICORT) 160-4.5 mcg/actuation HFAA Inhale 2 puffs into the lungs every 12 (twelve) hours.    ergocalciferol (ERGOCALCIFEROL) 50,000 unit Cap Take 50,000 Units by mouth every 7 days.    loratadine (CLARITIN) 10 mg tablet Take 10 mg by mouth daily as needed.    montelukast (SINGULAIR) 10 mg tablet Take 10 mg by mouth once daily.    nitroGLYCERIN (NITROSTAT) 0.3 MG SL tablet Place 0.3 mg under the tongue every 5 (five) minutes as needed.    omeprazole (PRILOSEC) 20 MG capsule Take 20 mg by mouth once daily.    prednisoLONE acetate (PRED MILD) 0.12 % ophthalmic suspension Place 2 drops into both eyes 2 (two) times daily.    predniSONE (DELTASONE) 5 MG tablet Take 5 mg by mouth 2 (two) times daily.    ropinirole (REQUIP) 0.25 MG tablet Take 1 mg by mouth 2 (two) times daily. Take one tab for 2 days,  Then 2 tab for for 5 days, Then 4 tab for 30 days.    simvastatin (ZOCOR) 40 MG tablet Take 20 mg by mouth every evening.    terazosin (HYTRIN) 10 MG capsule Take 10 mg by mouth every evening.    trazodone (DESYREL) 100 MG tablet Take 150 mg by mouth every evening.     venlafaxine (EFFEXOR-XR) 75 MG 24 hr capsule Take 75 mg by mouth once daily. Take 3 capsules  by mouth every morning.     Family History       Problem Relation (Age of Onset)    Cancer Father    Heart failure Mother          Tobacco Use    Smoking status: Never Smoker    Smokeless tobacco: Never Used   Substance and Sexual Activity    Alcohol use: No    Drug use: No    Sexual activity: Not Currently     Partners: Female     Review of Systems   Reason unable to perform ROS: Information obtained from wife.   Constitutional:  Positive for activity change (bed bound), appetite change ("poor") and fatigue.   Skin:  Positive for wound.   Neurological:  Positive for weakness.   Objective:     Vital Signs " (Most Recent):  Temp: 98.6 °F (37 °C) (03/16/22 1448)  Pulse: 86 (03/17/22 0018)  Resp: 12 (03/16/22 2232)  BP: (!) 90/58 (03/17/22 0018)  SpO2: 98 % (03/16/22 2232)   Vital Signs (24h Range):  Temp:  [98.6 °F (37 °C)] 98.6 °F (37 °C)  Pulse:  [72-98] 86  Resp:  [11-24] 12  SpO2:  [98 %-100 %] 98 %  BP: ()/(53-71) 90/58     Weight: 44 kg (97 lb)  Body mass index is 15.19 kg/m².    Physical Exam  Vitals reviewed.   Constitutional:       General: He is awake. He is not in acute distress.     Appearance: Normal appearance. He is cachectic. He is ill-appearing. He is not toxic-appearing.      Comments: Frail, elderly   HENT:      Head: Normocephalic and atraumatic.      Nose: Nose normal. No congestion.      Mouth/Throat:      Mouth: Mucous membranes are moist.   Eyes:      General: No scleral icterus.     Pupils: Pupils are equal, round, and reactive to light.   Neck:      Comments: Severe kyphosis  Cardiovascular:      Rate and Rhythm: Normal rate and regular rhythm.      Pulses: Normal pulses.      Heart sounds: Normal heart sounds.   Pulmonary:      Effort: Pulmonary effort is normal. No respiratory distress.      Breath sounds: Normal breath sounds. No wheezing or rhonchi.   Abdominal:      General: Abdomen is flat. Bowel sounds are normal. There is no distension.      Palpations: Abdomen is soft.      Tenderness: There is no abdominal tenderness. There is no guarding.      Hernia: A hernia is present. Hernia is present in the right inguinal area (inguinal).   Musculoskeletal:         General: Deformity (LLE contracted) present. Normal range of motion.      Cervical back: Normal range of motion and neck supple.      Right lower leg: No edema.      Left lower leg: No edema.   Skin:     General: Skin is warm and dry.      Capillary Refill: Capillary refill takes less than 2 seconds.          Neurological:      General: No focal deficit present.      Mental Status: He is alert.      Cranial Nerves: No facial  asymmetry.      Motor: Weakness present.      Gait: Gait abnormal.      Comments: Minimally responsive. Follows basic commands. Answers simple questions.    Psychiatric:         Attention and Perception: He is inattentive.         Mood and Affect: Mood normal. Affect is blunt and flat.         Speech: Speech is delayed.         Behavior: Behavior is withdrawn.         Cognition and Memory: Cognition is impaired. Memory is impaired.          Significant Labs:   Results for orders placed or performed during the hospital encounter of 03/16/22   CBC auto differential   Result Value Ref Range    WBC 10.66 3.90 - 12.70 K/uL    RBC 2.72 (L) 4.60 - 6.20 M/uL    Hemoglobin 7.2 (L) 14.0 - 18.0 g/dL    Hematocrit 23.3 (L) 40.0 - 54.0 %    MCV 86 82 - 98 fL    MCH 26.5 (L) 27.0 - 31.0 pg    MCHC 30.9 (L) 32.0 - 36.0 g/dL    RDW 17.6 (H) 11.5 - 14.5 %    Platelets 222 150 - 450 K/uL    MPV 9.4 9.2 - 12.9 fL    Immature Granulocytes 0.8 (H) 0.0 - 0.5 %    Gran # (ANC) 8.8 (H) 1.8 - 7.7 K/uL    Immature Grans (Abs) 0.09 (H) 0.00 - 0.04 K/uL    Lymph # 1.0 1.0 - 4.8 K/uL    Mono # 0.6 0.3 - 1.0 K/uL    Eos # 0.1 0.0 - 0.5 K/uL    Baso # 0.04 0.00 - 0.20 K/uL    nRBC 0 0 /100 WBC    Gran % 82.5 (H) 38.0 - 73.0 %    Lymph % 9.8 (L) 18.0 - 48.0 %    Mono % 5.6 4.0 - 15.0 %    Eosinophil % 0.9 0.0 - 8.0 %    Basophil % 0.4 0.0 - 1.9 %    Differential Method Automated    Comprehensive metabolic panel   Result Value Ref Range    Sodium 135 (L) 136 - 145 mmol/L    Potassium 4.0 3.5 - 5.1 mmol/L    Chloride 103 95 - 110 mmol/L    CO2 27 23 - 29 mmol/L    Glucose 106 70 - 110 mg/dL    BUN 12 8 - 23 mg/dL    Creatinine 0.5 0.5 - 1.4 mg/dL    Calcium 7.2 (L) 8.7 - 10.5 mg/dL    Total Protein 5.3 (L) 6.0 - 8.4 g/dL    Albumin 1.1 (L) 3.5 - 5.2 g/dL    Total Bilirubin 0.4 0.1 - 1.0 mg/dL    Alkaline Phosphatase 81 55 - 135 U/L    AST 13 10 - 40 U/L    ALT 9 (L) 10 - 44 U/L    Anion Gap 5 (L) 8 - 16 mmol/L    eGFR if African American >60.0 >60  mL/min/1.73 m^2    eGFR if non African American >60.0 >60 mL/min/1.73 m^2   Lactic acid, plasma #1   Result Value Ref Range    Lactate (Lactic Acid) 1.1 0.5 - 2.2 mmol/L   Troponin I   Result Value Ref Range    Troponin I 0.017 0.000 - 0.026 ng/mL   Procalcitonin   Result Value Ref Range    Procalcitonin 0.07 <0.25 ng/mL   Protime-INR   Result Value Ref Range    Prothrombin Time 12.4 9.0 - 12.5 sec    INR 1.2 0.8 - 1.2   APTT   Result Value Ref Range    aPTT 30.6 21.0 - 32.0 sec   Magnesium   Result Value Ref Range    Magnesium 1.7 1.6 - 2.6 mg/dL   Lactic acid, plasma #2   Result Value Ref Range    Lactate (Lactic Acid) 1.8 0.5 - 2.2 mmol/L   Urinalysis, Reflex to Urine Culture Urine, Catheterized    Specimen: Urine   Result Value Ref Range    Specimen UA Urine, Catheterized     Color, UA Yellow Yellow, Straw, Isela    Appearance, UA Clear Clear    pH, UA 8.0 5.0 - 8.0    Specific Gravity, UA 1.010 1.005 - 1.030    Protein, UA Negative Negative    Glucose, UA Negative Negative    Ketones, UA Negative Negative    Bilirubin (UA) Negative Negative    Occult Blood UA 3+ (A) Negative    Nitrite, UA Negative Negative    Urobilinogen, UA Negative <2.0 EU/dL    Leukocytes, UA Negative Negative   Urinalysis Microscopic   Result Value Ref Range    RBC, UA 7 (H) 0 - 4 /hpf    WBC, UA 0 0 - 5 /hpf    Microscopic Comment SEE COMMENT    POCT COVID-19 Rapid Screening   Result Value Ref Range    POC Rapid COVID Negative Negative     Acceptable Yes          Significant Imaging:   Imaging Results              CT Abdomen Pelvis  Without Contrast (Final result)  Result time 03/16/22 19:21:05      Final result by Branden Estrada MD (03/16/22 19:21:05)                   Impression:      As above    All CT scans   are performed using dose optimization techniques including the following: automated exposure control; adjustment of the mA and/or kV; use of iterative reconstruction technique.  Dose modulation was employed for  ALARA by means of: Automated exposure control; adjustment of the mA and/or kV according to patient size (this includes techniques or standardized protocols for targeted exams where dose is matched to indication/reason for exam; i.e. extremities or head); and/or use of iterative reconstructive technique.      Electronically signed by: Sean Otero  Date:    03/16/2022  Time:    19:21               Narrative:    EXAMINATION:  CT ABDOMEN PELVIS WITHOUT CONTRAST    CLINICAL HISTORY:  attempt central line right groin;    TECHNIQUE:  Low dose axial images, sagittal and coronal reformations were obtained from the lung bases to the pubic symphysis, 30 mL of oral Omnipaque 350 was administered..    COMPARISON:  Recent prior    FINDINGS:  In the right inguinal region there appears to be herniated small bowel loops in the inguinal hernia.  Compare series 2, image 157 of recent exam 2 series 2, image 145 of the current exam.  No foci of gas in the adjacent mesentery or soft tissues to suggest bowel perforation.  Remaining findings similar to prior exam.  Discussed with Dr. Yates.  Attention on follow-up.                                       CT Abdomen Pelvis With Contrast (Final result)  Result time 03/16/22 17:13:52      Final result by Branden Estrada MD (03/16/22 17:13:52)                   Impression:      Severe gaseous distension of the sigmoid colon up to 14.8 cm may relate to distal obstruction.  Recommend clinical correlation and follow-up    Chronic dislocation and inflammatory change involving the left hip with large associated fluid collection may reflect inflamed synovium.  Underlying abscess not excluded.  Dystrophic calcification involving the left hip.    Moderate amount of stool in the colon    Decubitus ulcer in the presacral region    Mild basilar pleural effusions.    Moderate anasarca    Bilateral pelviectasis or mild hydronephrosis with a 8 mm left renal pelvic stone. Distended bladder with thickened  walls.    Recommend clinical correlation and follow-up    All CT scans at this facility use dose modulation, iterative reconstruction, and/or weight based dosing when appropriate to reduce radiation dose to as low as reasonably achievable.      Electronically signed by: Sean Ellisi  Date:    03/16/2022  Time:    17:13               Narrative:    EXAMINATION:  CT ABDOMEN PELVIS WITH CONTRAST    CLINICAL HISTORY:  low air collected in the abdomen;    TECHNIQUE:  Low dose axial images, sagittal and coronal reformations were obtained from the lung bases to the pubic symphysis following the IV administration of 100 mL of Omnipaque 350.    COMPARISON:  None    FINDINGS:  Mild subcutaneous edema.  A suggestion of a decubitus ulcer in the a pre sacral region.  Chronic dislocation and inflammatory process involving the left hip joint.  Dystrophic calcification and fluid collection is also identified involving the left hip.  Bladder wall is thickened and distended.  Severe distension of the sigmoid colon measures up to 14.8 cm on the diameter on coronal view.  Distended colon with gas and stool.  Mild basilar pleural effusions.  Basilar atelectasis less likely early consolidation.  Are grossly unremarkable.  Moderate amount of stool in the colon.  Atherosclerotic changes noted.  Moderate anasarca.  Right-sided renal cyst.  Bilateral pelviectasis or mild hydronephrosis.  Left renal pelvic stone measures up to 8 mm.                                       X-Ray Chest AP Portable (Final result)  Result time 03/16/22 15:31:37      Final result by Vineet Palacios MD (03/16/22 15:31:37)                   Impression:      1.  Low lung volumes with vascular crowding present.  Lungs are otherwise clear.    2.  Large air collection underneath the hemidiaphragm most likely dilated stomach or loop of colon.  Clinical correlation is advised    3.  Stable findings as noted above.      Electronically signed by: Vineet Palacios  MD  Date:    03/16/2022  Time:    15:31               Narrative:    EXAMINATION:  XR CHEST AP PORTABLE    CLINICAL HISTORY:  Sepsis;    COMPARISON:  July 6, 2017    FINDINGS:  Low lung volumes with mild vascular crowding present.  The lungs are clear. The cardiac silhouette size is normal. The trachea is midline and the mediastinal width is normal. Negative for focal infiltrate, effusion or pneumothorax. Pulmonary vasculature is normal. Negative for osseous abnormalities. Tortuous aorta.  Multiple calcified hilar and mediastinal lymph nodes.    Large air collection underneath the hemidiaphragm, either markedly distended colon or stomach.                                    Results for orders placed or performed during the hospital encounter of 03/16/22   EKG 12-lead    Collection Time: 03/16/22  3:09 PM    Narrative    Test Reason : R53.1,    Vent. Rate : 094 BPM     Atrial Rate : 094 BPM     P-R Int : 136 ms          QRS Dur : 068 ms      QT Int : 332 ms       P-R-T Axes : 060 009 036 degrees     QTc Int : 415 ms    Normal sinus rhythm  Low voltage QRS  Nonspecific T wave abnormality  Abnormal ECG  No previous ECGs available  Confirmed by LUIS AGUIRRE MD ([id number]) on 3/16/2022 5:51:30 PM    Referred By: [email or web address]   SELF           Confirmed By:LUIS AGUIRRE MD         Assessment/Plan:     * Severe sepsis  Likely secondary to decubitus wound infection  BC x2 pending  Continue IV vanc and cefepime for now  Monitor vitals closely  Continue IV resuscitation - monitor for fluid overload        Colon distention, sigmoid  CT - severe gaseous distention of sigmoid colon - possible distal obstruction   GI consulted   General Surgery consulted   Maintain rectal tube        Anemia  Possibly secondary to open/bleeding wounds  Type and screen sent   Monitor for excessive bleeding  Trend H&H - transfuse as indicated   Iron studies pending      Pressure injury of skin of contiguous region involving back, buttock, and hip  Secondary to  immobility   Wound care consulted   BC x2 pending  Social work consult for dc planning/hospice  Boost with meals   Maintain rectal tube      Dementia without behavioral disturbance  Appears stable at baseline  Continue home [facility]  Palliative care/Hospice consult per family request  Pt is DNR      Functional quadriplegia  Secondary to chronic dislocated left hip (s/p x3 surgeries)   Palliative care consulted   Fall precautions   Social Work consult         COPD with asthma  No evidence of acute exacerbation   Supplemental O2 PRN  Duonebs scheduled and PRN  Pulmicort nebs BID          VTE Risk Mitigation (From admission, onward)         Ordered     Place sequential compression device  Until discontinued         03/17/22 0036                   Magaly Jacobson NP  Department of Hospital Medicine   [facility] - Telemetry ([facility])

## 2022-03-17 NOTE — PROGRESS NOTES
Received communication from Dr. Yates regarding this patient. [facility]. He was brought in by family this evening for hypotension. Imaging shows severely dilated sigmoid colon with a presumed distal obstruction. There is no obvious volvulus or transition point. There is also moderate amount of stool in the colon. Dr. Yates was able to release some gas from the rectal vault when performing a rectal exam.     Also discussed case with Ermelinda Nava MD surgeon. Ideally GI would attempt to decompress the patient and place rectal tube if rectal tube available and able to pass the obstruction. If mass present, may need to be diverted however patient appears to be too sick for surgery at this time and is DNR. We are available urgently if needed this evening however patient must be resuscitated and hemodynamically stable to proceed with endoscopic evaluation.

## 2022-03-18 VITALS
HEART RATE: 84 BPM | OXYGEN SATURATION: 98 % | SYSTOLIC BLOOD PRESSURE: 90 MMHG | RESPIRATION RATE: 18 BRPM | DIASTOLIC BLOOD PRESSURE: 53 MMHG | HEIGHT: 67 IN | TEMPERATURE: 98 F | WEIGHT: 123.44 LBS | BODY MASS INDEX: 19.37 KG/M2

## 2022-03-18 PROBLEM — K59.00 CONSTIPATION: Status: ACTIVE | Noted: 2022-03-18

## 2022-03-18 PROBLEM — E87.6 HYPOKALEMIA: Status: ACTIVE | Noted: 2022-03-18

## 2022-03-18 PROBLEM — E43 SEVERE MALNUTRITION: Status: ACTIVE | Noted: 2022-03-18

## 2022-03-18 PROBLEM — E87.1 HYPONATREMIA: Status: ACTIVE | Noted: 2022-03-18

## 2022-03-18 PROBLEM — R53.81 DEBILITY: Status: ACTIVE | Noted: 2022-03-18

## 2022-03-18 PROBLEM — E83.42 HYPOMAGNESEMIA: Status: ACTIVE | Noted: 2022-03-18

## 2022-03-18 PROBLEM — E83.39 HYPOPHOSPHATEMIA: Status: ACTIVE | Noted: 2022-03-18

## 2022-03-18 LAB
ANION GAP SERPL CALC-SCNC: 4 MMOL/L (ref 8–16)
AORTIC ROOT ANNULUS: 3.24 CM
ASCENDING AORTA: 3.32 CM
AV INDEX (PROSTH): 1.18
AV MEAN GRADIENT: 4 MMHG
AV PEAK GRADIENT: 5 MMHG
AV VALVE AREA: 3.43 CM2
AV VELOCITY RATIO: 0.93
BASOPHILS # BLD AUTO: 0.03 K/UL (ref 0–0.2)
BASOPHILS # BLD AUTO: 0.05 K/UL (ref 0–0.2)
BASOPHILS NFR BLD: 0.3 % (ref 0–1.9)
BASOPHILS NFR BLD: 0.5 % (ref 0–1.9)
BSA FOR ECHO PROCEDURE: 1.74 M2
BUN SERPL-MCNC: 8 MG/DL (ref 8–23)
CALCIUM SERPL-MCNC: 7.6 MG/DL (ref 8.7–10.5)
CHLORIDE SERPL-SCNC: 107 MMOL/L (ref 95–110)
CO2 SERPL-SCNC: 24 MMOL/L (ref 23–29)
CREAT SERPL-MCNC: 0.5 MG/DL (ref 0.5–1.4)
CV ECHO LV RWT: 0.55 CM
DIFFERENTIAL METHOD: ABNORMAL
DIFFERENTIAL METHOD: ABNORMAL
DOP CALC AO PEAK VEL: 1.16 M/S
DOP CALC AO VTI: 18.5 CM
DOP CALC LVOT AREA: 2.9 CM2
DOP CALC LVOT DIAMETER: 1.92 CM
DOP CALC LVOT PEAK VEL: 1.08 M/S
DOP CALC LVOT STROKE VOLUME: 63.37 CM3
DOP CALCLVOT PEAK VEL VTI: 21.9 CM
E WAVE DECELERATION TIME: 247.97 MSEC
E/A RATIO: 0.67
E/E' RATIO: 6.22 M/S
ECHO EF ESTIMATED: 62 %
ECHO LV POSTERIOR WALL: 1 CM (ref 0.6–1.1)
EJECTION FRACTION: 60 %
EOSINOPHIL # BLD AUTO: 0.2 K/UL (ref 0–0.5)
EOSINOPHIL # BLD AUTO: 0.2 K/UL (ref 0–0.5)
EOSINOPHIL NFR BLD: 1.8 % (ref 0–8)
EOSINOPHIL NFR BLD: 2.2 % (ref 0–8)
ERYTHROCYTE [DISTWIDTH] IN BLOOD BY AUTOMATED COUNT: 17.3 % (ref 11.5–14.5)
ERYTHROCYTE [DISTWIDTH] IN BLOOD BY AUTOMATED COUNT: 17.3 % (ref 11.5–14.5)
EST. GFR  (AFRICAN AMERICAN): >60 ML/MIN/1.73 M^2
EST. GFR  (NON AFRICAN AMERICAN): >60 ML/MIN/1.73 M^2
FRACTIONAL SHORTENING: 33 % (ref 28–44)
GLUCOSE SERPL-MCNC: 128 MG/DL (ref 70–110)
HCT VFR BLD AUTO: 22.5 % (ref 40–54)
HCT VFR BLD AUTO: 28 % (ref 40–54)
HGB BLD-MCNC: 7 G/DL (ref 14–18)
HGB BLD-MCNC: 8.6 G/DL (ref 14–18)
IMM GRANULOCYTES # BLD AUTO: 0.04 K/UL (ref 0–0.04)
IMM GRANULOCYTES # BLD AUTO: 0.07 K/UL (ref 0–0.04)
IMM GRANULOCYTES NFR BLD AUTO: 0.4 % (ref 0–0.5)
IMM GRANULOCYTES NFR BLD AUTO: 0.7 % (ref 0–0.5)
INTERVENTRICULAR SEPTUM: 1.18 CM (ref 0.6–1.1)
LEFT ATRIUM SIZE: 3.31 CM
LEFT INTERNAL DIMENSION IN SYSTOLE: 2.43 CM (ref 2.1–4)
LEFT VENTRICLE DIASTOLIC VOLUME INDEX: 31.45 ML/M2
LEFT VENTRICLE DIASTOLIC VOLUME: 55.03 ML
LEFT VENTRICLE MASS INDEX: 71 G/M2
LEFT VENTRICLE SYSTOLIC VOLUME INDEX: 11.9 ML/M2
LEFT VENTRICLE SYSTOLIC VOLUME: 20.79 ML
LEFT VENTRICULAR INTERNAL DIMENSION IN DIASTOLE: 3.62 CM (ref 3.5–6)
LEFT VENTRICULAR MASS: 123.46 G
LV LATERAL E/E' RATIO: 5.6 M/S
LV SEPTAL E/E' RATIO: 7 M/S
LVOT MG: 3.37 MMHG
LVOT MV: 0.88 CM/S
LYMPHOCYTES # BLD AUTO: 0.8 K/UL (ref 1–4.8)
LYMPHOCYTES # BLD AUTO: 0.8 K/UL (ref 1–4.8)
LYMPHOCYTES NFR BLD: 7.8 % (ref 18–48)
LYMPHOCYTES NFR BLD: 8.3 % (ref 18–48)
MAGNESIUM SERPL-MCNC: 1.6 MG/DL (ref 1.6–2.6)
MCH RBC QN AUTO: 26.2 PG (ref 27–31)
MCH RBC QN AUTO: 26.4 PG (ref 27–31)
MCHC RBC AUTO-ENTMCNC: 30.7 G/DL (ref 32–36)
MCHC RBC AUTO-ENTMCNC: 31.1 G/DL (ref 32–36)
MCV RBC AUTO: 84 FL (ref 82–98)
MCV RBC AUTO: 86 FL (ref 82–98)
MONOCYTES # BLD AUTO: 0.4 K/UL (ref 0.3–1)
MONOCYTES # BLD AUTO: 0.5 K/UL (ref 0.3–1)
MONOCYTES NFR BLD: 4.4 % (ref 4–15)
MONOCYTES NFR BLD: 4.8 % (ref 4–15)
MV PEAK A VEL: 0.83 M/S
MV PEAK E VEL: 0.56 M/S
MV STENOSIS PRESSURE HALF TIME: 71.91 MS
MV VALVE AREA P 1/2 METHOD: 3.06 CM2
NEUTROPHILS # BLD AUTO: 8.1 K/UL (ref 1.8–7.7)
NEUTROPHILS # BLD AUTO: 8.3 K/UL (ref 1.8–7.7)
NEUTROPHILS NFR BLD: 84.2 % (ref 38–73)
NEUTROPHILS NFR BLD: 84.6 % (ref 38–73)
NRBC BLD-RTO: 0 /100 WBC
NRBC BLD-RTO: 0 /100 WBC
PHOSPHATE SERPL-MCNC: 2.3 MG/DL (ref 2.7–4.5)
PISA TR MAX VEL: 2.13 M/S
PLATELET # BLD AUTO: 242 K/UL (ref 150–450)
PLATELET # BLD AUTO: 283 K/UL (ref 150–450)
PMV BLD AUTO: 9.6 FL (ref 9.2–12.9)
PMV BLD AUTO: 9.7 FL (ref 9.2–12.9)
POTASSIUM SERPL-SCNC: 3.3 MMOL/L (ref 3.5–5.1)
RBC # BLD AUTO: 2.67 M/UL (ref 4.6–6.2)
RBC # BLD AUTO: 3.26 M/UL (ref 4.6–6.2)
SODIUM SERPL-SCNC: 135 MMOL/L (ref 136–145)
TDI LATERAL: 0.1 M/S
TDI SEPTAL: 0.08 M/S
TDI: 0.09 M/S
TR MAX PG: 18 MMHG
TRICUSPID ANNULAR PLANE SYSTOLIC EXCURSION: 1.9 CM
VANCOMYCIN SERPL-MCNC: 14.6 UG/ML
WBC # BLD AUTO: 9.64 K/UL (ref 3.9–12.7)
WBC # BLD AUTO: 9.77 K/UL (ref 3.9–12.7)

## 2022-03-18 PROCEDURE — 36415 COLL VENOUS BLD VENIPUNCTURE: CPT | Performed by: NURSE PRACTITIONER

## 2022-03-18 PROCEDURE — 80202 ASSAY OF VANCOMYCIN: CPT | Performed by: INTERNAL MEDICINE

## 2022-03-18 PROCEDURE — 96366 THER/PROPH/DIAG IV INF ADDON: CPT

## 2022-03-18 PROCEDURE — 63600175 PHARM REV CODE 636 W HCPCS: Performed by: INTERNAL MEDICINE

## 2022-03-18 PROCEDURE — 63600175 PHARM REV CODE 636 W HCPCS: Performed by: NURSE PRACTITIONER

## 2022-03-18 PROCEDURE — 25000242 PHARM REV CODE 250 ALT 637 W/ HCPCS: Performed by: NURSE PRACTITIONER

## 2022-03-18 PROCEDURE — 84100 ASSAY OF PHOSPHORUS: CPT | Performed by: INTERNAL MEDICINE

## 2022-03-18 PROCEDURE — 85025 COMPLETE CBC W/AUTO DIFF WBC: CPT | Mod: 91 | Performed by: NURSE PRACTITIONER

## 2022-03-18 PROCEDURE — 96367 TX/PROPH/DG ADDL SEQ IV INF: CPT

## 2022-03-18 PROCEDURE — 99499 UNLISTED E&M SERVICE: CPT | Mod: ,,, | Performed by: INTERNAL MEDICINE

## 2022-03-18 PROCEDURE — 97163 PT EVAL HIGH COMPLEX 45 MIN: CPT

## 2022-03-18 PROCEDURE — 83735 ASSAY OF MAGNESIUM: CPT | Performed by: INTERNAL MEDICINE

## 2022-03-18 PROCEDURE — 80048 BASIC METABOLIC PNL TOTAL CA: CPT | Performed by: INTERNAL MEDICINE

## 2022-03-18 PROCEDURE — 36415 COLL VENOUS BLD VENIPUNCTURE: CPT | Performed by: INTERNAL MEDICINE

## 2022-03-18 PROCEDURE — G0378 HOSPITAL OBSERVATION PER HR: HCPCS

## 2022-03-18 PROCEDURE — 94640 AIRWAY INHALATION TREATMENT: CPT

## 2022-03-18 PROCEDURE — 25000003 PHARM REV CODE 250: Performed by: INTERNAL MEDICINE

## 2022-03-18 PROCEDURE — 97166 OT EVAL MOD COMPLEX 45 MIN: CPT

## 2022-03-18 PROCEDURE — 99499 NO LOS: ICD-10-PCS | Mod: ,,, | Performed by: INTERNAL MEDICINE

## 2022-03-18 PROCEDURE — 97530 THERAPEUTIC ACTIVITIES: CPT

## 2022-03-18 PROCEDURE — 85025 COMPLETE CBC W/AUTO DIFF WBC: CPT | Performed by: INTERNAL MEDICINE

## 2022-03-18 PROCEDURE — 94761 N-INVAS EAR/PLS OXIMETRY MLT: CPT

## 2022-03-18 PROCEDURE — 25000003 PHARM REV CODE 250: Performed by: NURSE PRACTITIONER

## 2022-03-18 RX ORDER — MAGNESIUM SULFATE 1 G/100ML
1 INJECTION INTRAVENOUS ONCE
Status: COMPLETED | OUTPATIENT
Start: 2022-03-18 | End: 2022-03-18

## 2022-03-18 RX ORDER — MIDODRINE HYDROCHLORIDE 5 MG/1
5 TABLET ORAL 3 TIMES DAILY
Qty: 90 TABLET | Refills: 0 | Status: SHIPPED | OUTPATIENT
Start: 2022-03-18 | End: 2022-04-17

## 2022-03-18 RX ORDER — ACETAMINOPHEN 325 MG/1
650 TABLET ORAL EVERY 6 HOURS PRN
Refills: 0
Start: 2022-03-18

## 2022-03-18 RX ORDER — BISACODYL 10 MG
10 SUPPOSITORY, RECTAL RECTAL 2 TIMES DAILY
Status: DISCONTINUED | OUTPATIENT
Start: 2022-03-18 | End: 2022-03-18 | Stop reason: HOSPADM

## 2022-03-18 RX ORDER — SODIUM,POTASSIUM PHOSPHATES 280-250MG
2 POWDER IN PACKET (EA) ORAL ONCE
Status: COMPLETED | OUTPATIENT
Start: 2022-03-18 | End: 2022-03-18

## 2022-03-18 RX ORDER — MAGNESIUM SULFATE HEPTAHYDRATE 40 MG/ML
2 INJECTION, SOLUTION INTRAVENOUS ONCE
Status: COMPLETED | OUTPATIENT
Start: 2022-03-18 | End: 2022-03-18

## 2022-03-18 RX ADMIN — BUDESONIDE 0.5 MG: 0.5 INHALANT ORAL at 07:03

## 2022-03-18 RX ADMIN — FLUTICASONE PROPIONATE 50 MCG: 50 SPRAY, METERED NASAL at 08:03

## 2022-03-18 RX ADMIN — VANCOMYCIN HYDROCHLORIDE 750 MG: 750 INJECTION, POWDER, LYOPHILIZED, FOR SOLUTION INTRAVENOUS at 02:03

## 2022-03-18 RX ADMIN — CEFEPIME HYDROCHLORIDE 1 G: 1 INJECTION, SOLUTION INTRAVENOUS at 05:03

## 2022-03-18 RX ADMIN — CEFEPIME HYDROCHLORIDE 1 G: 1 INJECTION, SOLUTION INTRAVENOUS at 08:03

## 2022-03-18 RX ADMIN — MAGNESIUM SULFATE HEPTAHYDRATE 2 G: 2 INJECTION, SOLUTION INTRAVENOUS at 03:03

## 2022-03-18 RX ADMIN — POTASSIUM, SODIUM PHOSPHATES 280 MG-160 MG-250 MG ORAL POWDER PACKET 2 PACKET: POWDER IN PACKET at 01:03

## 2022-03-18 RX ADMIN — MAGNESIUM SULFATE 1 G: 1 INJECTION INTRAVENOUS at 01:03

## 2022-03-18 RX ADMIN — IPRATROPIUM BROMIDE AND ALBUTEROL SULFATE 3 ML: 2.5; .5 SOLUTION RESPIRATORY (INHALATION) at 12:03

## 2022-03-18 RX ADMIN — MEMANTINE 5 MG: 5 TABLET ORAL at 08:03

## 2022-03-18 RX ADMIN — MIDODRINE HYDROCHLORIDE 5 MG: 5 TABLET ORAL at 08:03

## 2022-03-18 RX ADMIN — CEFEPIME HYDROCHLORIDE 1 G: 1 INJECTION, SOLUTION INTRAVENOUS at 01:03

## 2022-03-18 RX ADMIN — IPRATROPIUM BROMIDE AND ALBUTEROL SULFATE 3 ML: 2.5; .5 SOLUTION RESPIRATORY (INHALATION) at 02:03

## 2022-03-18 RX ADMIN — IPRATROPIUM BROMIDE AND ALBUTEROL SULFATE 3 ML: 2.5; .5 SOLUTION RESPIRATORY (INHALATION) at 07:03

## 2022-03-18 RX ADMIN — OLOPATADINE HYDROCHLORIDE 1 DROP: 1 SOLUTION OPHTHALMIC at 08:03

## 2022-03-18 RX ADMIN — BISACODYL 10 MG: 10 SUPPOSITORY RECTAL at 03:03

## 2022-03-18 NOTE — PLAN OF CARE
Patient total A of 2 for bed mobility with profound generalized weakness. Poor rehab candidate. Recommending home with 24/7 care vs basic nursing home placement at d/c.

## 2022-03-18 NOTE — ASSESSMENT & PLAN NOTE
3/17 CT - severe gaseous distention of sigmoid colon - possible distal obstruction   GI consulted   General Surgery consulted   Maintain rectal tube  3/18 Ct scan abdomen  shows a lot of stool - Add bid dulcolax suppository  x 2

## 2022-03-18 NOTE — PROGRESS NOTES
Spoke with HM team and patient's family declines hospice. They want to continue with aggressive care with the expectation he will get better.   He continues to have hypotension putting at risk for a sedated procedure. We will order a gastrografin enema and discuss results with patient/family when available.   Mirza Jesus PA-C.    ADDENDUM:  Patient's family have changed their mind and elected hospice. Enema canceled. Re-consult as needed.   Mirza Jesus PA-C.

## 2022-03-18 NOTE — PT/OT/SLP EVAL
Occupational Therapy   Evaluation and Discharge Note    Name: Abdoulaye Nguyen Sr.  MRN: [id number]  Admitting Diagnosis:  Severe sepsis   Recent Surgery: * No surgery found *      Recommendations:     Discharge Recommendations: home, nursing facility, basic  Discharge Equipment Recommendations:  lift device  Barriers to discharge:  None    Assessment:     Abdoulaye Nguyen Sr. is a 76 y.o. male with a medical diagnosis of Severe sepsis. At this time, patient is functioning at their prior level of function and does not require further acute OT services.     Plan:     During this hospitalization, patient does not require further acute OT services.  Please re-consult if situation changes.    · Plan of Care Reviewed with: patient, daughter    Subjective     Chief Complaint: none reported  Patient/Family Comments/goals: none reported    Occupational Profile:  Living Environment: Patient resides in a 2 story home (BA/BR on 1st floor) with his wife and 3 daughters.  Previous level of function: Patient has been dependent with bed mobility and squat>pivot OOB to w/c since fall in Oct 2021. Patient requires A with all ADLs.  Roles and Routines: n/a  Equipment Used at home:  hospital bed, wheelchair  Assistance upon Discharge: family    Pain/Comfort:  · Pain Rating 1: 0/10  · Pain Addressed 1:  (activity pacing)    Objective:     Communicated with: NurseRadha, prior to session.  Patient found supine with bed alarm, telemetry, peripheral IV, ibrahim catheter upon OT entry to room.    General Precautions: Standard, fall   Orthopedic Precautions:N/A   Braces: N/A  Respiratory Status: Room air     Bed Mobility:    · Patient completed Supine to Sit with total assistance and 2 persons  · Patient completed Sit to Supine with total assistance and 2 persons    Functional Mobility/Transfers:  · Not appropriate for functional transfer OOB to chair    Activities of Daily Living:  · Grooming: total assistance .  · Upper Body Dressing:  total assistance .  · Lower Body Dressing: total assistance .    Cognitive/Visual Perceptual:  Cognitive/Psychosocial Skills:     -       Oriented to: Person   -       Follows Commands/attention:Easily distracted and Follows one-step commands    Physical Exam:  Balance:    -       sitting: poor  Upper Extremity Range of Motion:     -       Right Upper Extremity: PROM grossly WFL  -       Left Upper Extremity: PROM grossly WFL  Upper Extremity Strength:    -       Right Upper Extremity: Deficits: grossly 2-/5  -       Left Upper Extremity: Deficits: grossly 2-/5   Strength:    -       Right Upper Extremity: Deficits: poor  -       Left Upper Extremity: Deficits: poor    AMPAC 6 Click ADL:  AMPAC Total Score: 7    Treatment & Education:  Patient and daughter educated on role of OT in acute setting. Patient provided with max cueing in attempts to improve active participation in movement. Delayed processing, but cooperative throughout.  Education:    Patient left supine with all lines intact, call button in reach, bed alarm on and daughter present    History:     Past Medical History:   Diagnosis Date    Anemia, unspecified     Asthma     COPD (chronic obstructive pulmonary disease)     Decubitus ulcer     Dementia due to Parkinson's disease without behavioral disturbance     Encounter for blood transfusion     GERD (gastroesophageal reflux disease)     Hip dislocation, left chronic     HLD (hyperlipidemia)     Parkinson's disease     PTSD (post-traumatic stress disorder)        Past Surgical History:   Procedure Laterality Date    BACK SURGERY      TOTAL HIP ARTHROPLASTY Left     x3       Time Tracking:     OT Date of Treatment: 03/18/22  OT Start Time: 1115  OT Stop Time: 1140  OT Total Time (min): 25 min    Billable Minutes:Evaluation 25    3/18/2022

## 2022-03-18 NOTE — PROGRESS NOTES
I was asked to follow pt for distended colon with possible colon obstruction. No evidence of acute abd or evidence of perforation on xray studies. GI was consulted for evaluation and possible decompression of colon. Pt has had hypotension during admission which precludes him from sedation and colonoscopy. GI was going to attempt gastrograffin enema today but family ultimately decided to consult hospice.

## 2022-03-18 NOTE — SIGNIFICANT EVENT
Received message earlier this am from  that wife had spoke with Sherrill of [facility] and has signed papers for patient to be admitted to Hospice.      Bell Nguyen Spouse [phone]     Called and spoke with wife who verified she wanted patient to have Hospice. I and had long discussion regaurding patient's overall status. Explained to her that he had albumin of 1.1 and that meant He would have a hard time healing his decubitus, fighting infection, and making blood due to critically low levels. Also explained to wife he could pass on in a few weeks or less so she is aware of his grave condition. Wife verbalized understanding and understood importance of keeping him comfortable.

## 2022-03-18 NOTE — DISCHARGE SUMMARY
[facility] - Telemetry ([facility])  Hospital Medicine  Discharge Summary    Patient Name: Abdoulaye Nguyen Sr.  MRN: [id number]  Patient Class: OP- Observation  Admission Date: 3/16/2022  Hospital Length of Stay: 0 days  Discharge Date and Time:  03/18/2022 3:07 PM  Attending Physician: Mara Garcia MD   Discharging Provider: JW Enriquez  Primary Care Provider: Cyndi Veronica MD    HPI:   Abdoulaye Nguyen is a 75 y/o M with hx of dementia, decubitus, anemia, GERD, asthma, PTSD, HLD, COPD, Parkinson's and R hip fracture presented to the ED in [facility] with complaints with gradually increased generalized weakness and poor PO intake [facility] for infected sacral wound. Symptoms are progressive and moderate in severity. No mitigating or exacerbating factors. ROS is limited by pt's dementia/mental status - some information obtained from wife and daughter at bedside. Found in ER to have BP of 83/53 and RR of 24. Pt afebrile. WBCs 10.66; H&H 7.2&23.2; Na 136; K 4; BUN 12; Cr 0.5; . CXR unremarkable. CT showed distended colon, possible distal obstruction, moderate amount of stool, mild basilar pleural effusion, no evidence of obstruction. EKG showed NSR at 94. COVID-19 negative. UA uninfected. In ER, pt was given cefepime, vanc and flagyl and sepsis IVF bolus - ibrahim catheter was placed. Pt BP improved to 108/67 and RR to 12. Hospital Medicine was called and pt was transmitted to [facility] and placed in Observation with telemetry monitoring - tolerated well. Seen and examined at bedside. NADN, denies pain, family at bedside. Pt is DNR, surrogate decision maker is his wife, Bell Nguyen.       * No surgery found *      Hospital Course:   The patient was monitored closely during his stay.  He was initially kept  NPO and GI was consulted for his distended abdomen. He was given Abx and wound care for his decubitus. He was ordered for dulcolax suppository for his  constipation.  Patient remained in guarded condition with multiple comorbidities including infected sacral decubitus with severe sepsis, anemia, and severe malnutrition with albumin of 1.1. Hospice was consulted. Patient's overall condition discussed with wife Bell Nguyen and she was in agreement with Hospice. Patient was  discharged to home with Hospice. Prognosis grim and was explained to wife that patient may pass away in next few weeks or before then.      Goals of Care Treatment Preferences:  Code Status: DNR    Consults:   Consults (From admission, onward)        Status Ordering Provider     Inpatient consult to Registered Dietitian/Nutritionist             Completed JACINDA GEORGE     Inpatient consult to General Surgery  Once        Provider:  Ermelinda Nava MD    Acknowledged SEGUNDO BERRY     Inpatient consult to Social Work         Completed SEGUNDO BERRY     Inpatient consult to Gastroenterology  Once        Provider:  Yulisa Friedman MD    Completed CAMRYN SOUZA          Final Active Diagnoses:    Diagnosis Date Noted POA    PRINCIPAL PROBLEM:  Severe sepsis [A41.9, R65.20] 03/17/2022 Yes    Debility [R53.81] 03/18/2022 Yes    Hyponatremia [E87.1] 03/18/2022 Yes    Hypokalemia [E87.6] 03/18/2022 Yes    Hypophosphatemia [E83.39] 03/18/2022 Yes    Hypomagnesemia [E83.42] 03/18/2022 Yes    Severe malnutrition [E43] 03/18/2022 Yes    Constipation [K59.00] 03/18/2022 Yes    Normocytic anemia [D64.9] 03/17/2022 Yes    Functional quadriplegia [R53.2] 03/17/2022 Yes    Colon distention, sigmoid [K63.89] 03/17/2022 Yes    Dementia without behavioral disturbance [F03.90] 03/16/2022 Yes    Pressure injury of skin of contiguous region involving back, buttock, and hip [L89.40] 03/16/2022 Yes    Hypotension [I95.9] 03/16/2022 Yes    COPD with asthma [J44.9] 03/06/2014 Yes      Problems Resolved During this Admission:       Discharged Condition: poor- prognosis  grim    Disposition: Home with [facility]      Follow Up: [facility]     Patient Instructions:      Ambulatory referral/consult to Hospice   Standing Status: Future   Referral Priority: Routine Referral Type: Consultation   Referral Reason: Specialty Services Required   Requested Specialty: Hospice and Palliative Medicine   Number of Visits Requested: 1     Diet Dysphagia Mechanical Soft   Order Comments: Diet as tolerated     Notify your health care provider if you experience any of the following:   Order Comments: Call hospice for any problems, questions, or signs of decline     Change dressing (specify)   Order Comments: Dressing changes: Continue prior wound care     Activity as tolerated   Order Comments: Fall, skin, and aspiration precautions  Turn q 2 hours       Significant Diagnostic Studies:     Lab Results   Component Value Date    WBC 9.64 03/18/2022    HGB 8.6 (L) 03/18/2022    HCT 28.0 (L) 03/18/2022    MCV 86 03/18/2022     03/18/2022       CMP  Sodium   Date Value Ref Range Status   03/18/2022 135 (L) 136 - 145 mmol/L Final     Potassium   Date Value Ref Range Status   03/18/2022 3.3 (L) 3.5 - 5.1 mmol/L Final     Chloride   Date Value Ref Range Status   03/18/2022 107 95 - 110 mmol/L Final     CO2   Date Value Ref Range Status   03/18/2022 24 23 - 29 mmol/L Final     Glucose   Date Value Ref Range Status   03/18/2022 128 (H) 70 - 110 mg/dL Final     BUN   Date Value Ref Range Status   03/18/2022 8 8 - 23 mg/dL Final     Creatinine   Date Value Ref Range Status   03/18/2022 0.5 0.5 - 1.4 mg/dL Final     Calcium   Date Value Ref Range Status   03/18/2022 7.6 (L) 8.7 - 10.5 mg/dL Final     Total Protein   Date Value Ref Range Status   03/17/2022 5.4 (L) 6.0 - 8.4 g/dL Final     Albumin   Date Value Ref Range Status   03/17/2022 1.1 (L) 3.5 - 5.2 g/dL Final     Total Bilirubin   Date Value Ref Range Status   03/17/2022 0.3 0.1 - 1.0 mg/dL Final     Comment:     For infants and  newborns, interpretation of results should be based  on gestational age, weight and in agreement with clinical  observations.    Premature Infant recommended reference ranges:  Up to 24 hours.............<8.0 mg/dL  Up to 48 hours............<12.0 mg/dL  3-5 days..................<15.0 mg/dL  6-29 days.................<15.0 mg/dL       Alkaline Phosphatase   Date Value Ref Range Status   03/17/2022 82 55 - 135 U/L Final     AST   Date Value Ref Range Status   03/17/2022 19 10 - 40 U/L Final     ALT   Date Value Ref Range Status   03/17/2022 6 (L) 10 - 44 U/L Final     Anion Gap   Date Value Ref Range Status   03/18/2022 4 (L) 8 - 16 mmol/L Final     eGFR if    Date Value Ref Range Status   03/18/2022 >60 >60 mL/min/1.73 m^2 Final     eGFR if non    Date Value Ref Range Status   03/18/2022 >60 >60 mL/min/1.73 m^2 Final     Comment:     Calculation used to obtain the estimated glomerular filtration  rate (eGFR) is the CKD-EPI equation.          Radiology Results (last 7 days)    Procedure Component Value Units Date/Time   X-Ray Abdomen AP 1 View [[id number]] Resulted: 03/17/22 1234   Order Status: Completed Updated: 03/17/22 1236   Narrative:     EXAMINATION:   XR ABDOMEN AP 1 VIEW     CLINICAL HISTORY:   Distension;     TECHNIQUE:   AP View(s) of the abdomen was performed.     COMPARISON:   None     FINDINGS:   Moderate amount of stool throughout the colon suggesting constipation.  Moderate gaseous distension of the transverse and descending colon.  Nonobstructive bowel gas pattern.  No visualized free air.  Complete osseous destruction of the left hip with scattered heterotopic ossification.  No convincing evidence of urolithiasis.    Impression:       No acute abdominal abnormality.  Moderate constipation findings.       Electronically signed by: Saurabh Talavera   Date: 03/17/2022   Time: 12:34   CT Abdomen Pelvis Without Contrast [[id number]] Resulted: 03/16/22 1921   Order Status:  Completed Updated: 03/16/22 1923   Narrative:     EXAMINATION:   CT ABDOMEN PELVIS WITHOUT CONTRAST     CLINICAL HISTORY:   attempt central line right groin;     TECHNIQUE:   Low dose axial images, sagittal and coronal reformations were obtained from the lung bases to the pubic symphysis, 30 mL of oral Omnipaque 350 was administered..     COMPARISON:   Recent prior     FINDINGS:   In the right inguinal region there appears to be herniated small bowel loops in the inguinal hernia.  Compare series 2, image 157 of recent exam 2 series 2, image 145 of the current exam.  No foci of gas in the adjacent mesentery or soft tissues to suggest bowel perforation.  Remaining findings similar to prior exam.  Discussed with Dr. Yates.  Attention on follow-up.    Impression:       As above     All CT scans   are performed using dose optimization techniques including the following: automated exposure control; adjustment of the mA and/or kV; use of iterative reconstruction technique.  Dose modulation was employed for ALARA by means of: Automated exposure control; adjustment of the mA and/or kV according to patient size (this includes techniques or standardized protocols for targeted exams where dose is matched to indication/reason for exam; i.e. extremities or head); and/or use of iterative reconstructive technique.       Electronically signed by: Sean Otero   Date: 03/16/2022   Time: 19:21   CT Abdomen Pelvis With Contrast [[id number]] Resulted: 03/16/22 1713   Order Status: Completed Updated: 03/16/22 1716   Narrative:     EXAMINATION:   CT ABDOMEN PELVIS WITH CONTRAST     CLINICAL HISTORY:   low air collected in the abdomen;     TECHNIQUE:   Low dose axial images, sagittal and coronal reformations were obtained from the lung bases to the pubic symphysis following the IV administration of 100 mL of Omnipaque 350.     COMPARISON:   None     FINDINGS:   Mild subcutaneous edema.  A suggestion of a decubitus ulcer in the a pre  sacral region.  Chronic dislocation and inflammatory process involving the left hip joint.  Dystrophic calcification and fluid collection is also identified involving the left hip.  Bladder wall is thickened and distended.  Severe distension of the sigmoid colon measures up to 14.8 cm on the diameter on coronal view.  Distended colon with gas and stool.  Mild basilar pleural effusions.  Basilar atelectasis less likely early consolidation.  Are grossly unremarkable.  Moderate amount of stool in the colon.  Atherosclerotic changes noted.  Moderate anasarca.  Right-sided renal cyst.  Bilateral pelviectasis or mild hydronephrosis.  Left renal pelvic stone measures up to 8 mm.    Impression:       Severe gaseous distension of the sigmoid colon up to 14.8 cm may relate to distal obstruction.  Recommend clinical correlation and follow-up     Chronic dislocation and inflammatory change involving the left hip with large associated fluid collection may reflect inflamed synovium.  Underlying abscess not excluded.  Dystrophic calcification involving the left hip.     Moderate amount of stool in the colon     Decubitus ulcer in the presacral region     Mild basilar pleural effusions.     Moderate anasarca     Bilateral pelviectasis or mild hydronephrosis with a 8 mm left renal pelvic stone. Distended bladder with thickened walls.     Recommend clinical correlation and follow-up     All CT scans at this facility use dose modulation, iterative reconstruction, and/or weight based dosing when appropriate to reduce radiation dose to as low as reasonably achievable.       Electronically signed by: Sean Otero   Date: 03/16/2022   Time: 17:13   X-Ray Chest AP Portable [[id number]] Resulted: 03/16/22 1531   Order Status: Completed Updated: 03/16/22 1534   Narrative:     EXAMINATION:   XR CHEST AP PORTABLE     CLINICAL HISTORY:   Sepsis;     COMPARISON:   July 6, 2017     FINDINGS:   Low lung volumes with mild vascular crowding present.   The lungs are clear. The cardiac silhouette size is normal. The trachea is midline and the mediastinal width is normal. Negative for focal infiltrate, effusion or pneumothorax. Pulmonary vasculature is normal. Negative for osseous abnormalities. Tortuous aorta.  Multiple calcified hilar and mediastinal lymph nodes.     Large air collection underneath the hemidiaphragm, either markedly distended colon or stomach.    Impression:       1.  Low lung volumes with vascular crowding present.  Lungs are otherwise clear.     2.  Large air collection underneath the hemidiaphragm most likely dilated stomach or loop of colon.  Clinical correlation is advised     3.  Stable findings as noted above.       Electronically signed by: Vineet Palacios MD   Date: 03/16/2022   Time: 15:31       Microbiology Results (Last 90 Days)    Procedure Component Value Units Date/Time   Blood culture x two cultures. Draw prior to antibiotics. [[id number]] Collected: 03/16/22 1614   Order Status: Completed Specimen: Blood from Peripheral, Lower Arm, Right Updated: 03/18/22 0612    Blood Culture, Routine No Growth to date     No Growth to date   Narrative:     Aerobic and anaerobic   Blood culture x two cultures. Draw prior to antibiotics. [[id number]] Collected: 03/16/22 1509   Order Status: Completed Specimen: Blood from Line, Femoral, Left Updated: 03/18/22 0612    Blood Culture, Routine No Growth to date     No Growth to date   Narrative:     Aerobic and anaerobic       Pending Diagnostic Studies:     Procedure Component Value Units Date/Time    Echo [[id number]]  (Abnormal) Resulted: 03/17/22 1637    Order Status: Sent Lab Status: In process Updated: 03/17/22 1639     BSA 1.74 m2      TDI SEPTAL 0.08 m/s      LV LATERAL E/E' RATIO 5.60 m/s      LV SEPTAL E/E' RATIO 7.00 m/s      Left Ventricular Outflow Tract Mean Velocity 0.8606417144 cm/s      Left Ventricular Outflow Tract Mean Gradient 3.37 mmHg      TDI LATERAL 0.10 m/s      LVIDd 3.62 cm       IVS 1.18 cm      Posterior Wall 1.00 cm      Ao root annulus 3.24 cm      LVIDs 2.43 cm      FS 33 %      Ascending aorta 3.32 cm      LV mass 123.46 g      LA size 3.31 cm      TAPSE 1.90 cm      Left Ventricle Relative Wall Thickness 0.55 cm      AV mean gradient 4 mmHg      AV valve area 3.43 cm2      AV Velocity Ratio 0.93     AV index (prosthetic) 1.18     MV valve area p 1/2 method 3.06 cm2      E/A ratio 0.67     Mean e' 0.09 m/s      E wave deceleration time [id number] msec      LVOT diameter 1.92 cm      LVOT area 2.9 cm2      LVOT peak klaus 1.08 m/s      LVOT peak VTI 21.90 cm      Ao peak klaus 1.16 m/s      Ao VTI 18.5 cm      LVOT stroke volume 63.37 cm3      AV peak gradient 5 mmHg      E/E' ratio 6.22 m/s      MV Peak E Klaus 0.56 m/s      TR Max Klaus 2.13 m/s      MV stenosis pressure 1/2 time 71.181307229096647 ms      MV Peak A Klaus 0.83 m/s      LV Systolic Volume 20.79 mL      LV Systolic Volume Index 11.9 mL/m2      LV Diastolic Volume 55.03 mL      LV Diastolic Volume Index 31.45 mL/m2      LV Mass Index 71 g/m2      Echo EF Estimated 62 %      Triscuspid Valve Regurgitation Peak Gradient 18 mmHg     Lactic acid, plasma [[id number]]     Order Status: Sent Lab Status: No result     Specimen: Blood          Medications:  Reconciled Home Medications:      Medication List      START taking these medications    acetaminophen 325 MG tablet  Commonly known as: TYLENOL  Take 2 tablets (650 mg total) by mouth every 6 (six) hours as needed for Pain.     midodrine 5 MG Tab  Commonly known as: PROAMATINE  Take 1 tablet (5 mg total) by mouth 3 (three) times daily. For hypotension        CONTINUE taking these medications    * albuterol 2.5 mg /3 mL (0.083 %) nebulizer solution  Commonly known as: PROVENTIL  Take by nebulization 4 (four) times daily as needed.     * albuterol 90 mcg/actuation inhaler  Commonly known as: PROVENTIL/VENTOLIN HFA  Inhale 2 puffs into the lungs every 6 (six) hours as needed for  Wheezing.     blood sugar diagnostic Strp  by Misc.(Non-Drug; Combo Route) route.     budesonide-formoterol 160-4.5 mcg 160-4.5 mcg/actuation Hfaa  Commonly known as: SYMBICORT  Inhale 2 puffs into the lungs every 12 (twelve) hours.     ergocalciferol 50,000 unit Cap  Commonly known as: ERGOCALCIFEROL  Take 50,000 Units by mouth every 7 days.     fluticasone propionate 50 mcg/actuation nasal spray  Commonly known as: FLONASE  1 spray by Each Nare route once daily.     ketotifen 0.025 % (0.035 %) ophthalmic solution  Commonly known as: ZADITOR  Place 1 drop into both eyes 2 (two) times daily.     memantine 10 MG Tab  Commonly known as: NAMENDA  Take 5 mg by mouth 2 (two) times daily.     potassium chloride 40 mEq/15 mL Liqd  Take 20 mEq by mouth once daily.     psyllium packet  Commonly known as: HYDROCIL  Take 1 packet by mouth once daily.     sodium hypochlorite 0.5 % external solution  Commonly known as: DAKIN'S SOLUTION  10 mLs.     sulfamethoxazole-trimethoprim 800-160mg 800-160 mg Tab  Commonly known as: BACTRIM DS  Take 1 tablet by mouth 2 (two) times daily.     tiotropium 18 mcg inhalation capsule  Commonly known as: SPIRIVA  Inhale 18 mcg into the lungs once daily.         * This list has 2 medication(s) that are the same as other medications prescribed for you. Read the directions carefully, and ask your doctor or other care provider to review them with you.                Indwelling Lines/Drains at time of discharge:   Lines/Drains/Airways     Drain  Duration                Urethral Catheter 03/16/22 2015 16 Fr. 1 day                Time spent on the discharge of patient: 82 minutes       JW Enriquez  Department of Hospital Medicine  [id number] ([facility])

## 2022-03-18 NOTE — PLAN OF CARE
[facility] - Telemetry (Hospital)  Discharge Final Note    Primary Care Provider: Cyndi Veronica MD    Expected Discharge Date: 3/18/2022    Final Discharge Note (most recent)     Final Note - 03/18/22 1427        Final Note    Assessment Type Final Discharge Note     Anticipated Discharge Disposition Hospice/Home     Hospital Resources/Appts/Education Provided Appointments scheduled and added to AVS        Post-Acute Status    Post-Acute Authorization Hospice     Hospice Status Set-up Complete/Auth obtained     Discharge Delays None known at this time               Swer confirmed with pt's wife that patient will discharge home today with [facility]. Patient's RN to arrange [facility] transportation for discharge once ready to go. RN to call report to Sherrill with [facility] at [phone].   [facility]     No additional CM needs for discharge.     Important Message from Medicare

## 2022-03-18 NOTE — PLAN OF CARE
Problem: Infection  Goal: Absence of Infection Signs and Symptoms  Outcome: Ongoing, Progressing     Problem: Adult Inpatient Plan of Care  Goal: Plan of Care Review  Outcome: Ongoing, Progressing  Goal: Patient-Specific Goal (Individualized)  Outcome: Ongoing, Progressing  Goal: Absence of Hospital-Acquired Illness or Injury  Outcome: Ongoing, Progressing  Goal: Optimal Comfort and Wellbeing  Outcome: Ongoing, Progressing  Goal: Readiness for Transition of Care  Outcome: Ongoing, Progressing

## 2022-03-18 NOTE — PT/OT/SLP EVAL
Physical Therapy Evaluation and Discharge Note    Patient Name:  Abdoulaye Nguyen Sr.   MRN:  [id number]    Recommendations:     Discharge Recommendations:  home (HOME WITH CONT. 24 HOUR CARE FROM FAMILY)   Discharge Equipment Recommendations: lift device   Barriers to discharge: None    Assessment:     Abdoulaye Nguyen Sr. is a 76 y.o. male admitted with a medical diagnosis of Severe sepsis. .  At this time, patient is functioning at their prior level of function and does not require further acute PT services.     Recent Surgery: * No surgery found *      Plan:     During this hospitalization, patient does not require further acute PT services.  Please re-consult if situation changes.  D/C PT FROM P.T. SERVICES TO PEOPLE 'S PROGRAM FOR CONT. BED EX. TO PT'S TOLERANCE    Subjective     Chief Complaint:   Patient/Family Comments/goals:   Pain/Comfort:  · Pain Rating 1: 0/10    Patients cultural, spiritual, Anabaptism conflicts given the current situation:      Living Environment:  PT LIVES AT HOME WITH WIFE AND DAUGHTERS 2 STORY HOUSE, BR ON 1ST FLOOR, NO STEPS TO ENTER, PT IS MOSTLY BED BOUND AT HOME, TOTAL CARE WITH FAMILY FOR BED MOBILITY, TF'S, AND ADL'S  Prior to admission, patients level of function was TOTAL ASSIST.  Equipment used at home: hospital bed, wheelchair (LIFT CHAIR).  DME owned (not currently used): none.  Upon discharge, patient will have assistance from FAMILY.    Objective:     Communicated with NURSE SZYMANSKI prior to session.  Patient found right sidelying with bed alarm, telemetry, peripheral IV, ibrahim catheter upon PT entry to room.    General Precautions: Standard, fall   Orthopedic Precautions:N/A   Braces: N/A   Respiratory Status: Room air    Exams:  · Cognitive Exam:  Patient is oriented to Person, Place and VERY LIMITED VERBALIZATION, MOST QUESTIONS ANSWERED BY PT'S DAUGHTER PRESENT  · Postural Exam:  Patient presented with the following abnormalities:    · -       Rounded  shoulders  · -       Forward head  · Skin Integrity/Edema:      · -       Edema: Moderate BLE  · RLE ROM: LIMITED, NO ACTIVE MOVEMENT OUT OF HIP AND KNEE FLEX OR OUT OF  PF AT ANKLE, RIGID WITH PROM INTO EXT., UNABLE TO TOLERATE FULL EXT AT HIP AND KNEE WITH PROM  · LLE ROM:  LIMITED, NO ACTIVE MOVEMENT OUT OF HIP AND KNEE FLEX OR OUT OF PF AT ANKLE, RIGID WITH PROM INTO EXT., UNABLE TO TOLERATE FULL EXT AT HIP AND KNEE WITH PROM    Functional Mobility:  · Bed Mobility:     · Rolling Left:  dependence  · Rolling Right: dependence  · Scooting: dependence  · Supine to Sit: dependence  · Sit to Supine: dependence  · Balance: POOR SITTING BALANCE WITH STATIC SIT AT EOB    AM-PAC 6 CLICK MOBILITY  Total Score:6     Therapeutic Activities and Exercises:   PT AND DAUGHTER EDUCATED IN ROLE OF P.T., PT ASSISTED TO EOB WITH TOTAL ASSIST X 2, SAT APPROX. 8 MINUTES WITH MODA, VERY RIGID WITH MOVEMENT THROUGHOUT BODY, RESISTIVE TO MOVEMENT, R LATERAL LEAN SITTING EOB.  ATTEMPTED BLE THEREX BUT PT UNABLE TO ACTIVELY PARTICIPATE    AM-PAC 6 CLICK MOBILITY  Total Score:6     Patient left right sidelying with all lines intact, call button in reach, bed alarm on, NURSE notified and DAUGHTER present.    GOALS:   Multidisciplinary Problems     Physical Therapy Goals     Not on file                History:     Past Medical History:   Diagnosis Date    Anemia, unspecified     Asthma     COPD (chronic obstructive pulmonary disease)     Decubitus ulcer     Dementia due to Parkinson's disease without behavioral disturbance     Encounter for blood transfusion     GERD (gastroesophageal reflux disease)     Hip dislocation, left chronic     HLD (hyperlipidemia)     Parkinson's disease     PTSD (post-traumatic stress disorder)        Past Surgical History:   Procedure Laterality Date    BACK SURGERY      TOTAL HIP ARTHROPLASTY Left     x3       Time Tracking:     PT Received On: 03/18/22  PT Start Time: 1125     PT Stop Time:  1148  PT Total Time (min): 23 min     Billable Minutes: Evaluation 15 and Therapeutic Activity 8    03/18/2022

## 2022-03-18 NOTE — PROGRESS NOTES
Pharmacokinetic Assessment Follow Up: IV Vancomycin    Vancomycin serum concentration assessment(s):    The random level was drawn late (~15 hours after the dose instead of ~12 hours post dose). However, level can still be used to guide therapy at this time. The measurement is below the desired definitive target range of 10 to 20 mcg/mL.    Vancomycin Regimen Plan:    Will continue pulse dosing regimen with a one time dose of vancomycin 750 mg.     Re-dose when the random level is less than 20 mcg/mL, next level to be drawn at 1300 on 03/18    Drug levels (last 3 results):  Recent Labs   Lab Result Units 03/17/22 2031   Vancomycin, Random ug/mL 8.7       Pharmacy will continue to follow and monitor vancomycin.    Please contact pharmacy at extension [phone] for questions regarding this assessment.    Thank you for the consult,   Tonie Mcfarland       Patient brief summary:  Abdoulaye Nguyen . is a 76 y.o. male initiated on antimicrobial therapy with IV Vancomycin for treatment of skin & soft tissue infection    The patient's current regimen is pulse dosing.     Drug Allergies:   Review of patient's allergies indicates:   Allergen Reactions    Relafen [nabumetone] Hives and Rash       Actual Body Weight:   64.4 kg    Renal Function:   Estimated Creatinine Clearance: 118.1 mL/min (based on SCr of 0.5 mg/dL).,     Dialysis Method (if applicable):  N/A    CBC (last 72 hours):  Recent Labs   Lab Result Units 03/16/22  1509 03/17/22  0532   WBC K/uL 10.66 9.71   Hemoglobin g/dL 7.2* 7.7*   Hematocrit % 23.3* 25.4*   Platelets K/uL 222 194   Gran % % 82.5* 81.1*   Lymph % % 9.8* 9.6*   Mono % % 5.6 5.9   Eosinophil % % 0.9 2.1   Basophil % % 0.4 0.4   Differential Method  Automated Automated       Metabolic Panel (last 72 hours):  Recent Labs   Lab Result Units 03/16/22  1509 03/16/22 2026 03/17/22  0532   Sodium mmol/L 135*  --  135*   Potassium mmol/L 4.0  --  3.6   Chloride mmol/L 103  --  105   CO2 mmol/L 27  --  25    Glucose mg/dL 106  --  72   Glucose, UA   --  Negative  --    BUN mg/dL 12  --  9   Creatinine mg/dL 0.5  --  0.5   Albumin g/dL 1.1*  --  1.1*   Total Bilirubin mg/dL 0.4  --  0.3   Alkaline Phosphatase U/L 81  --  82   AST U/L 13  --  19   ALT U/L 9*  --  6*   Magnesium mg/dL 1.7  --  1.6       Vancomycin Administrations:  vancomycin given in the last 96 hours                     vancomycin 500 mg in dextrose 5 % 100 mL IVPB (ready to mix system) (mg) 500 mg New Bag 03/17/22 0523    vancomycin in dextrose 5 % 1 gram/250 mL IVPB 1,000 mg (mg) 1,000 mg New Bag 03/16/22 0629                    Microbiologic Results:  Microbiology Results (last 7 days)       Procedure Component Value Units Date/Time    Blood culture x two cultures. Draw prior to antibiotics. [[id number]] Collected: 03/16/22 1614    Order Status: Completed Specimen: Blood from Peripheral, Lower Arm, Right Updated: 03/17/22 0945     Blood Culture, Routine No Growth to date    Narrative:      Aerobic and anaerobic    Blood culture x two cultures. Draw prior to antibiotics. [[id number]] Collected: 03/16/22 1509    Order Status: Completed Specimen: Blood from Line, Femoral, Left Updated: 03/17/22 0945     Blood Culture, Routine No Growth to date    Narrative:      Aerobic and anaerobic

## 2022-03-18 NOTE — NURSING
Sherrill, liaison for [facility]Pall[facility] care, she called to get updates on possible discharge planning for patient. She can be reached at [phone]. Author will notify  who spoke with family yesterday.

## 2022-03-18 NOTE — PLAN OF CARE
Recommendation/Intervention: 3/18  1. Recommend to continue a clear liquid diet and advance as tolerated.   2. Recommend to consider Torito BID to promote wound healing.   3. Weekly weights per RD follow up.    Willa Fuentes RD,LDN

## 2022-03-18 NOTE — PLAN OF CARE
Fracisco notified by Sherrill with [facility] that family contacted agency last night regarding hospice. Consents were signed with pt's wife and home delivery of equipment has been arranged.   Sherrill requested to be notified once patient is ready for discharge.  Providers made aware via secure chat.   Swer to f/u.

## 2022-03-18 NOTE — NURSING
Author called to speak with Sherrill the liason for [place]. Sherrill update on patients condition. All questions and concerns asked and answered. Author to call or text Sherrill when patient is being loaded up on ambulance from hospital.

## 2022-03-18 NOTE — HOSPITAL COURSE
The patient was monitored closely during his stay.  He was initially kept  NPO and GI was consulted for his distended abdomen. He was given Abx and wound care for his decubitus. Patient remained in guarded condition with multiple comorbidities including infected sacral decubitus with severe sepsis, anemia, and severe malnutrition with albumin of 1.1. Hospice was consulted. Patient's overall condition discussed with wife Bell Nguyen and she was in agreement with Hospice. Patient was discharged to home with Hopsice. Prognosis grim.

## 2022-03-18 NOTE — CONSULTS
Pharmacokinetic Assessment Follow Up and Sign Off: IV Vancomycin    Vancomycin serum concentration assessment(s):    The random level was drawn correctly and can be used to guide therapy at this time. The measurement is within the desired definitive target of less than 20 mcg/mL.    Vancomycin Regimen Plan:    Therapy with Vancomycin complete and/or consult discontinued by provider.  Pharmacy will sign off, please re-consult as needed.    Drug levels (last 3 results):  Recent Labs   Lab Result Units 03/17/22 2031 03/18/22  1205   Vancomycin, Random ug/mL 8.7 14.6     Please contact pharmacy at extension [phone] for questions regarding this assessment.    Thank you for the consult,   Bertha Salazar PharmD       Patient brief summary:  Abdoulaye Nguyen Sr. is a 76 y.o. male initiated on antimicrobial therapy with IV Vancomycin for treatment of skin & soft tissue infection    The patient's current regimen is pulse dosing (dosing by level) when random level is less than 20 mcg/mL.    Drug Allergies:   Review of patient's allergies indicates:   Allergen Reactions    Relafen [nabumetone] Hives and Rash       Actual Body Weight:   56 kg    Renal Function:   Estimated Creatinine Clearance: 102.7 mL/min (based on SCr of 0.5 mg/dL).,     Dialysis Method (if applicable):  N/A    CBC (last 72 hours):  Recent Labs   Lab Result Units 03/16/22  1509 03/17/22  0532 03/18/22  0513 03/18/22  1158   WBC K/uL 10.66 9.71 9.77 9.64   Hemoglobin g/dL 7.2* 7.7* 7.0* 8.6*   Hematocrit % 23.3* 25.4* 22.5* 28.0*   Platelets K/uL 222 194 242 283   Gran % % 82.5* 81.1* 84.6* 84.2*   Lymph % % 9.8* 9.6* 7.8* 8.3*   Mono % % 5.6 5.9 4.8 4.4   Eosinophil % % 0.9 2.1 1.8 2.2   Basophil % % 0.4 0.4 0.3 0.5   Differential Method  Automated Automated Automated Automated       Metabolic Panel (last 72 hours):  Recent Labs   Lab Result Units 03/16/22  1509 03/16/22 2026 03/17/22  0532 03/18/22  0513   Sodium mmol/L 135*  --  135* 135*   Potassium  mmol/L 4.0  --  3.6 3.3*   Chloride mmol/L 103  --  105 107   CO2 mmol/L 27  --  25 24   Glucose mg/dL 106  --  72 128*   Glucose, UA   --  Negative  --   --    BUN mg/dL 12  --  9 8   Creatinine mg/dL 0.5  --  0.5 0.5   Albumin g/dL 1.1*  --  1.1*  --    Total Bilirubin mg/dL 0.4  --  0.3  --    Alkaline Phosphatase U/L 81  --  82  --    AST U/L 13  --  19  --    ALT U/L 9*  --  6*  --    Magnesium mg/dL 1.7  --  1.6 1.6   Phosphorus mg/dL  --   --   --  2.3*       Vancomycin Administrations:  vancomycin given in the last 96 hours                   vancomycin 750 mg in dextrose 5 % 250 mL IVPB (ready to mix system) (mg) 750 mg New Bag 03/18/22 0237    vancomycin 500 mg in dextrose 5 % 100 mL IVPB (ready to mix system) (mg) 500 mg New Bag 03/17/22 0523    vancomycin in dextrose 5 % 1 gram/250 mL IVPB 1,000 mg (mg) 1,000 mg New Bag 03/16/22 1719                Microbiologic Results:  Microbiology Results (last 7 days)     Procedure Component Value Units Date/Time    Blood culture x two cultures. Draw prior to antibiotics. [[id number]] Collected: 03/16/22 1614    Order Status: Completed Specimen: Blood from Peripheral, Lower Arm, Right Updated: 03/18/22 0612     Blood Culture, Routine No Growth to date      No Growth to date    Narrative:      Aerobic and anaerobic    Blood culture x two cultures. Draw prior to antibiotics. [[id number]] Collected: 03/16/22 1509    Order Status: Completed Specimen: Blood from Line, Femoral, Left Updated: 03/18/22 0612     Blood Culture, Routine No Growth to date      No Growth to date    Narrative:      Aerobic and anaerobic        Thank you for allowing us to participate in this patient's care.     Bertha Salazar, Jay 03/18/2022 2:29 PM

## 2022-03-18 NOTE — ASSESSMENT & PLAN NOTE
3/17 Possibly secondary to open/bleeding wounds  Type and screen sent   Monitor for excessive bleeding  Trend H&H - transfuse as indicated   Iron studies pending  3/18

## 2022-03-18 NOTE — CONSULTS
[facility] - Telemetry [facility])  Wound Care    Patient Name:  Abdoulaye Nguyen Sr.   MRN:  [id number]  Date: 3/18/2022  Diagnosis: Severe sepsis    History:     Past Medical History:   Diagnosis Date    Anemia, unspecified     Asthma     COPD (chronic obstructive pulmonary disease)     Decubitus ulcer     Dementia due to Parkinson's disease without behavioral disturbance     Encounter for blood transfusion     GERD (gastroesophageal reflux disease)     Hip dislocation, left chronic     HLD (hyperlipidemia)     Parkinson's disease     PTSD (post-traumatic stress disorder)        Social History     Socioeconomic History    Marital status:    Tobacco Use    Smoking status: Never Smoker    Smokeless tobacco: Never Used   Substance and Sexual Activity    Alcohol use: No    Drug use: No    Sexual activity: Not Currently     Partners: Female       Precautions:     Allergies as of 03/16/2022 - Reviewed 03/16/2022   Allergen Reaction Noted    Relafen [nabumetone] Hives and Rash 01/17/2014       [facility] Assessment Details/Treatment     Consulted to this 76 year old male patient for multiple present on admission pressure injuries.     03/18/2022

## 2022-03-18 NOTE — CONSULTS
[facility] - Telemetry ([facility])  Adult Nutrition  Consult Note    SUMMARY     Recommendations    Recommendation/Intervention:   1. Recommend to continue a clear liquid diet and advance as tolerated.   2. Recommend to consider Torito BID to promote wound healing.   3. Weekly weights per RD follow up.    Goals:   1. Patient will increase PO intake to meet at least >50% of estimated energy needs by RD follow up.    Nutrition Goal Status: new    Communication of RD Recs:  (Plan of care;Sticky Note)    Assessment and Plan    Nutrition Problem:  Severe Protein-Calorie Malnutrition  Malnutrition in the context of Chronic Illness/Injury    Related to (etiology):  Decreased appetite     Signs and Symptoms (as evidenced by):  Energy Intake: <50% of estimated energy requirement for more than 7 days  Body Fat Depletion: severe depletion of orbitals and triceps   Muscle Mass Depletion: severe depletion of temples, clavicle region and scapular region   Dx: dementia and parkinson's, wound     Interventions(treatment strategy):  Advance diet as tolerated   Torito BID   Collaboration with Medical Providers     Nutrition Diagnosis Status:  New           Malnutrition Assessment  Malnutrition Type: chronic illness  Energy Intake: severe energy intake  Neck/Chest (Micronutrient): bony prominence  Musculoskeletal/Lower Extremities: muscle wasting, subcutaneous fat loss       Energy Intake (Malnutrition): less than 75% for greater than 7 days  Subcutaneous Fat (Malnutrition): severe depletion  Muscle Mass (Malnutrition): severe depletion   Orbital Region (Subcutaneous Fat Loss): severe depletion  Upper Arm Region (Subcutaneous Fat Loss): severe depletion   [place] Region (Muscle Loss): severe depletion  Clavicle Bone Region (Muscle Loss): severe depletion  Clavicle and Acromion Bone Region (Muscle Loss): severe depletion  Scapular Bone Region (Muscle Loss): severe depletion       Subcutaneous Fat Loss (Final Summary): severe protein-calorie  "malnutrition  Muscle Loss Evaluation (Final Summary): severe protein-calorie malnutrition                 Reason for Assessment    Reason For Assessment: consult  Diagnosis: infection/sepsis  Relevant Medical History: dementia, anemia, gerd, HLD, COPD, parkinsons  General Information Comments:     3/18:RD consulted for malnutrition. Patient is on a clear liquid diet. Patient has poor PO intake. Asked patient a couple questions and patient could barely speak. Patient has no edema noted. Patients last BM 3/17. Patient has no recentweights per past encounters. Per notes patient has had a decreased appetite. Patient also has sacral wound. Patient has had a couple stools per day which are closer to diarrhea. Patient is extremely ill and cachectic appearing. NFPE to upper body and patient is severely malnourished. Will continue to monitor.    Nutrition Discharge Planning: pending medical course    Nutrition Risk Screen    Nutrition Risk Screen: large or nonhealing wound, burn or pressure injury    Nutrition/Diet History    Patient Reported Diet/Restrictions/Preferences: general  Spiritual, Cultural Beliefs, Mandaeism Practices, Values that Affect Care: no  Food Allergies: NKFA  Factors Affecting Nutritional Intake: decreased appetite, clear liquid diet    Anthropometrics    Temp: 98 °F (36.7 °C)  Height Method: Stated  Height: 5' 7" (170.2 cm)  Height (inches): 67 in  Weight Method: Bed Scale  Weight: 56 kg (123 lb 7.3 oz)  Weight (lb): 123.46 lb  Ideal Body Weight (IBW), Male: 148 lb  % Ideal Body Weight, Male (lb): 83.42 %  BMI (Calculated): 19.3  BMI Grade: 18.5-24.9 - normal       Lab/Procedures/Meds  BMP  Lab Results   Component Value Date     (L) 03/18/2022    K 3.3 (L) 03/18/2022     03/18/2022    CO2 24 03/18/2022    BUN 8 03/18/2022    CREATININE 0.5 03/18/2022    CALCIUM 7.6 (L) 03/18/2022    ANIONGAP 4 (L) 03/18/2022    ESTGFRAFRICA >60 03/18/2022    EGFRNONAA >60 03/18/2022     Lab Results "   Component Value Date     (L) 03/18/2022    K 3.3 (L) 03/18/2022     03/18/2022    CO2 24 03/18/2022     Lab Results   Component Value Date    LABPROT 12.4 03/16/2022    ALBUMIN 1.1 (L) 03/17/2022     Lab Results   Component Value Date    CALCIUM 7.6 (L) 03/18/2022    PHOS 2.3 (L) 03/18/2022     Pertinent Labs Reviewed: reviewed  Pertinent Medications Reviewed: reviewed  Scheduled Meds:   albuterol-ipratropium  3 mL Nebulization Q6H    bisacodyL  10 mg Rectal BID    budesonide  0.5 mg Nebulization Q12H    ceFEPime (MAXIPIME) IVPB  1 g Intravenous Q8H    fluticasone propionate  1 spray Each Nostril Daily    magnesium sulfate IVPB  1 g Intravenous Once    Followed by    magnesium sulfate IVPB  2 g Intravenous Once    memantine  5 mg Oral BID    midodrine  5 mg Oral TID    olopatadine  1 drop Both Eyes BID     Continuous Infusions:  PRN Meds:.acetaminophen, albuterol-ipratropium, aluminum-magnesium hydroxide-simethicone, guaiFENesin 100 mg/5 ml, melatonin, ondansetron, psyllium husk (aspartame)    Physical Findings/Assessment         Estimated/Assessed Needs    Weight Used For Calorie Calculations: 56 kg (123 lb 7.3 oz)  Energy Calorie Requirements (kcal): 1680-1960 (30-35kcal/kg, malnourished)  Energy Need Method: Kcal/kg  Protein Requirements: 67-84 (1.2-1.5g/kg, wound)  Weight Used For Protein Calculations: 56 kg (123 lb 7.3 oz)  Fluid Requirements (mL): 1680-1960  Estimated Fluid Requirement Method: RDA Method  RDA Method (mL): 1680  CHO Requirement: 210-245      Nutrition Prescription Ordered    Current Diet Order: clear    Evaluation of Received Nutrient/Fluid Intake    % Kcal Needs: 0%  % Protein Needs: 0%  Energy Calories Required: not meeting needs  Protein Required: not meeting needs  Fluid Required: not meeting needs  % Intake of Estimated Energy Needs: 0 - 25 %  % Meal Intake: 0 - 25 %    Nutrition Risk    Level of Risk/Frequency of Follow-up: moderate - high       Monitor and  Evaluation    Food and Nutrient Intake: energy intake, food and beverage intake  Food and Nutrient Adminstration: diet order  Knowledge/Beliefs/Attitudes: food and nutrition knowledge/skill, beliefs and attitudes  Physical Activity and Function: nutrition-related ADLs and IADLs  Anthropometric Measurements: weight, weight change, body mass index  Biochemical Data, Medical Tests and Procedures: gastrointestinal profile, electrolyte and renal panel, glucose/endocrine profile, inflammatory profile, lipid profile  Nutrition-Focused Physical Findings: overall appearance       Nutrition Follow-Up    RD Follow-up?: Yes   Willa Fuentes RD,BKN

## 2022-03-18 NOTE — CONSULTS
"Food & Nutrition Education      Diet Education: Diabetes Nutrition Therapy  Time Spent: 15 minutes  Learners: Patient     Nutrition Education provided with handouts:   "Carbohydrate Counting for People with Diabetes" and "Diabetes Label Reading Tips" ([employer])    Comments:  Educated patient on dietary sources of carbohydrates, carb counting and daily consistent carbohydrate intake. Pt reports he has never had an issue with DM or hyperglycemia but was told this recent issue is d/t steroid use.    Discussed the importance of carbohydrates in the diet, but with diabetes, focusing on consistent carb intake throughout the day with emphasis on protein and fiber.   For a 2,000 calorie diet, aim for 225-275g (45-55%) daily CHO (goal: 3-4 servings (45-60 g) of carbs per meal with snacks in between).     Pt was engaged in conversation and says he will try to use information given.    NFPE not performed, pt appears well nourished.  All questions and concerns answered.   Provided handout with dietitian's contact information.   *Please re-consult as needed.  Thanks!  Adenike Valles, MS, RD, LDN  Tentative Next Date to be Seen by RD: 03/25/22         "

## 2022-03-22 LAB
BACTERIA BLD CULT: NORMAL
BACTERIA BLD CULT: NORMAL